# Patient Record
Sex: MALE | Race: WHITE | NOT HISPANIC OR LATINO | Employment: UNEMPLOYED | URBAN - METROPOLITAN AREA
[De-identification: names, ages, dates, MRNs, and addresses within clinical notes are randomized per-mention and may not be internally consistent; named-entity substitution may affect disease eponyms.]

---

## 2020-09-23 ENCOUNTER — OFFICE VISIT (OUTPATIENT)
Dept: FAMILY MEDICINE | Facility: CLINIC | Age: 35
End: 2020-09-23
Payer: COMMERCIAL

## 2020-09-23 VITALS
TEMPERATURE: 98.8 F | BODY MASS INDEX: 27.86 KG/M2 | SYSTOLIC BLOOD PRESSURE: 118 MMHG | HEART RATE: 93 BPM | DIASTOLIC BLOOD PRESSURE: 76 MMHG | OXYGEN SATURATION: 97 % | WEIGHT: 194.6 LBS | HEIGHT: 70 IN

## 2020-09-23 DIAGNOSIS — Z00.00 ROUTINE HISTORY AND PHYSICAL EXAMINATION OF ADULT: Primary | ICD-10-CM

## 2020-09-23 LAB
ALBUMIN SERPL-MCNC: 4.2 G/DL (ref 3.4–5)
ALP SERPL-CCNC: 74 U/L (ref 40–150)
ALT SERPL W P-5'-P-CCNC: 24 U/L (ref 0–70)
ANION GAP SERPL CALCULATED.3IONS-SCNC: 8 MMOL/L (ref 3–14)
AST SERPL W P-5'-P-CCNC: 10 U/L (ref 0–45)
BASOPHILS # BLD AUTO: 0 10E9/L (ref 0–0.2)
BASOPHILS NFR BLD AUTO: 0.7 %
BILIRUB SERPL-MCNC: 0.5 MG/DL (ref 0.2–1.3)
BUN SERPL-MCNC: 9 MG/DL (ref 7–30)
CALCIUM SERPL-MCNC: 8.9 MG/DL (ref 8.5–10.1)
CHLORIDE SERPL-SCNC: 108 MMOL/L (ref 94–109)
CHOLEST SERPL-MCNC: 223 MG/DL
CO2 SERPL-SCNC: 22 MMOL/L (ref 20–32)
CREAT SERPL-MCNC: 0.79 MG/DL (ref 0.66–1.25)
DIFFERENTIAL METHOD BLD: NORMAL
EOSINOPHIL # BLD AUTO: 0.2 10E9/L (ref 0–0.7)
EOSINOPHIL NFR BLD AUTO: 3 %
ERYTHROCYTE [DISTWIDTH] IN BLOOD BY AUTOMATED COUNT: 13.2 % (ref 10–15)
GFR SERPL CREATININE-BSD FRML MDRD: >90 ML/MIN/{1.73_M2}
GLUCOSE SERPL-MCNC: 87 MG/DL (ref 70–99)
HCT VFR BLD AUTO: 47.5 % (ref 40–53)
HDLC SERPL-MCNC: 35 MG/DL
HGB BLD-MCNC: 15.8 G/DL (ref 13.3–17.7)
IMM GRANULOCYTES # BLD: 0.1 10E9/L (ref 0–0.4)
IMM GRANULOCYTES NFR BLD: 0.9 %
LDLC SERPL CALC-MCNC: 129 MG/DL
LYMPHOCYTES # BLD AUTO: 2.3 10E9/L (ref 0.8–5.3)
LYMPHOCYTES NFR BLD AUTO: 39.4 %
MCH RBC QN AUTO: 30.8 PG (ref 26.5–33)
MCHC RBC AUTO-ENTMCNC: 33.3 G/DL (ref 31.5–36.5)
MCV RBC AUTO: 93 FL (ref 78–100)
MONOCYTES # BLD AUTO: 0.3 10E9/L (ref 0–1.3)
MONOCYTES NFR BLD AUTO: 6 %
NEUTROPHILS # BLD AUTO: 2.9 10E9/L (ref 1.6–8.3)
NEUTROPHILS NFR BLD AUTO: 50 %
NONHDLC SERPL-MCNC: 188 MG/DL
NRBC # BLD AUTO: 0 10*3/UL
NRBC BLD AUTO-RTO: 0 /100
PLATELET # BLD AUTO: 320 10E9/L (ref 150–450)
POTASSIUM SERPL-SCNC: 4.3 MMOL/L (ref 3.4–5.3)
PROT SERPL-MCNC: 7.7 G/DL (ref 6.8–8.8)
RBC # BLD AUTO: 5.13 10E12/L (ref 4.4–5.9)
SODIUM SERPL-SCNC: 139 MMOL/L (ref 133–144)
TRIGL SERPL-MCNC: 292 MG/DL
TSH SERPL DL<=0.005 MIU/L-ACNC: 1.23 MU/L (ref 0.4–4)
WBC # BLD AUTO: 5.7 10E9/L (ref 4–11)

## 2020-09-23 ASSESSMENT — ANXIETY QUESTIONNAIRES
7. FEELING AFRAID AS IF SOMETHING AWFUL MIGHT HAPPEN: SEVERAL DAYS
3. WORRYING TOO MUCH ABOUT DIFFERENT THINGS: SEVERAL DAYS
6. BECOMING EASILY ANNOYED OR IRRITABLE: MORE THAN HALF THE DAYS
1. FEELING NERVOUS, ANXIOUS, OR ON EDGE: SEVERAL DAYS
5. BEING SO RESTLESS THAT IT IS HARD TO SIT STILL: SEVERAL DAYS
IF YOU CHECKED OFF ANY PROBLEMS ON THIS QUESTIONNAIRE, HOW DIFFICULT HAVE THESE PROBLEMS MADE IT FOR YOU TO DO YOUR WORK, TAKE CARE OF THINGS AT HOME, OR GET ALONG WITH OTHER PEOPLE: NOT DIFFICULT AT ALL
GAD7 TOTAL SCORE: 9
2. NOT BEING ABLE TO STOP OR CONTROL WORRYING: SEVERAL DAYS

## 2020-09-23 ASSESSMENT — PATIENT HEALTH QUESTIONNAIRE - PHQ9
5. POOR APPETITE OR OVEREATING: MORE THAN HALF THE DAYS
SUM OF ALL RESPONSES TO PHQ QUESTIONS 1-9: 6

## 2020-09-23 ASSESSMENT — MIFFLIN-ST. JEOR: SCORE: 1819.19

## 2020-09-23 NOTE — NURSING NOTE
"35 year old  Chief Complaint   Patient presents with     Physical     Flu shot       Blood pressure 118/76, pulse 93, temperature 98.8  F (37.1  C), temperature source Oral, height 1.77 m (5' 9.7\"), weight 88.3 kg (194 lb 9.6 oz), SpO2 97 %. Body mass index is 28.16 kg/m .  BP completed using cuff size:    There is no problem list on file for this patient.      Wt Readings from Last 2 Encounters:   09/23/20 88.3 kg (194 lb 9.6 oz)     BP Readings from Last 3 Encounters:   09/23/20 118/76       No Known Allergies    Current Outpatient Medications   Medication     FINASTERIDE PO     No current facility-administered medications for this visit.        Social History     Tobacco Use     Smoking status: Never Smoker     Smokeless tobacco: Never Used   Substance Use Topics     Alcohol use: None     Drug use: None       Honoring Choices - Health Care Directive Guide offered to patient at time of visit.    Health Maintenance Due   Topic Date Due     PREVENTIVE CARE VISIT  1985     HIV SCREENING  04/07/2000     DTAP/TDAP/TD IMMUNIZATION (1 - Tdap) 04/07/2010     PHQ-2  01/01/2020     LIPID  04/07/2020     INFLUENZA VACCINE (1) 09/01/2020         There is no immunization history on file for this patient.    No results found for: PAP      No lab results found.    No flowsheet data found.    PHQ-9 SCORE 9/23/2020   PHQ-9 Total Score 6       CARROL-7 SCORE 9/23/2020   Total Score 9       No flowsheet data found.      Maryjo Joya, [facility]  September 23, 2020 12:49 PM    "

## 2020-09-24 LAB — DEPRECATED CALCIDIOL+CALCIFEROL SERPL-MC: 21 UG/L (ref 20–75)

## 2020-09-24 ASSESSMENT — ANXIETY QUESTIONNAIRES: GAD7 TOTAL SCORE: 9

## 2020-09-24 NOTE — PROGRESS NOTES
3  SUBJECTIVE:   CC: Ralph Nieves is an 35 year old male who presents for preventive health visit.     Patient has been advised of split billing requirements and indicates understanding: Yes  Healthy Habits:    Do you get at least three servings of calcium containing foods daily (dairy, green leafy vegetables, etc.)? yes    Amount of exercise or daily activities, outside of work: 20 minutes 5 times/week    Problems taking medications regularly No    Medication side effects: No    Have you had an eye exam in the past two years? yes    Do you see a dentist twice per year? yes    Do you have sleep apnea, excessive snoring or daytime drowsiness?no    Today's PHQ-2 Score: No flowsheet data found.    Abuse: Current or Past(Physical, Sexual or Emotional)- No  Do you feel safe in your environment? Yes        Social History     Tobacco Use     Smoking status: Never Smoker     Smokeless tobacco: Never Used   Substance Use Topics     Alcohol use: Not Currently     Comment: none     If you drink alcohol do you typically have >3 drinks per day or >7 drinks per week? No                      Last PSA: No results found for: PSA    Reviewed orders with patient. Reviewed health maintenance and updated orders accordingly - Yes  Lab work is in process    Reviewed and updated as needed this visit by clinical staff  Tobacco  Allergies  Meds         Reviewed and updated as needed this visit by Provider        No past medical history on file.   Past Surgical History:   Procedure Laterality Date     APPENDECTOMY  2015    Ruptured in [place]     XR KNEE SURGERY HENRIQUE LEFT         ROS:  CONSTITUTIONAL: NEGATIVE for fever, chills, change in weight  INTEGUMENTARY/SKIN: NEGATIVE for worrisome rashes, moles or lesions  EYES: NEGATIVE for vision changes or irritation  ENT: NEGATIVE for ear, mouth and throat problems  RESP: NEGATIVE for significant cough or SOB  CV: NEGATIVE for chest pain, palpitations or peripheral edema  GI: NEGATIVE for nausea,  "abdominal pain, heartburn, or change in bowel habits   male: negative for dysuria, hematuria, decreased urinary stream, erectile dysfunction, urethral discharge  MUSCULOSKELETAL: NEGATIVE for significant arthralgias or myalgia  NEURO: NEGATIVE for weakness, dizziness or paresthesias  PSYCHIATRIC: NEGATIVE for changes in mood or affect    OBJECTIVE:   /76   Pulse 93   Temp 98.8  F (37.1  C) (Oral)   Ht 1.77 m (5' 9.7\")   Wt 88.3 kg (194 lb 9.6 oz)   SpO2 97%   BMI 28.16 kg/m    EXAM:  GENERAL: healthy, alert and no distress  EYES: Eyes grossly normal to inspection, PERRL and conjunctivae and sclerae normal  HENT: ear canals and TM's normal, nose and mouth without ulcers or lesions  NECK: no adenopathy, no asymmetry, masses, or scars and thyroid normal to palpation  RESP: lungs clear to auscultation - no rales, rhonchi or wheezes  CV: regular rate and rhythm, normal S1 S2, no S3 or S4, no murmur, click or rub, no peripheral edema and peripheral pulses strong  :no inguinal hernias, normal scrotum, penis,  testes normal  ABDOMEN: soft, nontender, no hepatosplenomegaly, no masses and bowel sounds normal  MS: no gross musculoskeletal defects noted, no edema  SKIN: no suspicious lesions or rashes  NEURO: Normal strength and tone, mentation intact and speech normal  PSYCH: mentation appears normal, affect normal/bright    none     ASSESSMENT/PLAN:   1. Routine history and physical examination of adult    - CBC with platelets differential  - Comprehensive metabolic panel  - TSH with free T4 reflex  - Lipid panel reflex to direct LDL Fasting  - Vitamin D Level    Patient has been advised of split billing requirements and indicates understanding: Yes  COUNSELING:  Reviewed preventive health counseling, as reflected in patient instructions       Regular exercise       Healthy diet/nutrition       Vision screening       Alcohol Use    Estimated body mass index is 28.16 kg/m  as calculated from the following:    " "Height as of this encounter: 1.77 m (5' 9.7\").    Weight as of this encounter: 88.3 kg (194 lb 9.6 oz).    Return to clinic one year and prn      He reports that he has never smoked. He has never used smokeless tobacco.      Counseling Resources:  ATP IV Guidelines  Pooled Cohorts Equation Calculator  FRAX Risk Assessment  ICSI Preventive Guidelines  Dietary Guidelines for Americans, 2010  USDA's MyPlate  ASA Prophylaxis  Lung CA Screening    Celeste Roy NP  [facility]    "

## 2020-10-13 ENCOUNTER — VIRTUAL VISIT (OUTPATIENT)
Dept: FAMILY MEDICINE | Facility: CLINIC | Age: 35
End: 2020-10-13
Payer: COMMERCIAL

## 2020-10-13 DIAGNOSIS — E78.5 HYPERLIPIDEMIA LDL GOAL <100: Primary | ICD-10-CM

## 2020-10-13 DIAGNOSIS — R79.89 LOW VITAMIN D LEVEL: ICD-10-CM

## 2020-10-13 PROCEDURE — 99213 OFFICE O/P EST LOW 20 MIN: CPT | Mod: 95 | Performed by: NURSE PRACTITIONER

## 2020-10-13 ASSESSMENT — PAIN SCALES - GENERAL: PAINLEVEL: NO PAIN (0)

## 2020-10-13 NOTE — NURSING NOTE
Chief Complaint   Patient presents with     Results     Follow up on lab results.     TOMEKA Machuca 10:30 AM  10/13/2020

## 2020-10-13 NOTE — PROGRESS NOTES
"Ralph Nieves is a 35 year old male who is being evaluated via a billable video visit.      The patient has been notified of following:     \"This video visit will be conducted via a call between you and your physician/provider. We have found that certain health care needs can be provided without the need for an in-person physical exam.  This service lets us provide the care you need with a video conversation.  If a prescription is necessary we can send it directly to your pharmacy.  If lab work is needed we can place an order for that and you can then stop by our lab to have the test done at a later time.    Video visits are billed at different rates depending on your insurance coverage.  Please reach out to your insurance provider with any questions.    If during the course of the call the physician/provider feels a video visit is not appropriate, you will not be charged for this service.\"    Patient has given verbal consent for Video visit? Yes  How would you like to obtain your AVS? MyChart  If you are dropped from the video visit, the video invite should be resent to: Other e-mail: [place]  Will anyone else be joining your video visit? No    Subjective     Ralph Nieves is a 35 year old male who presents today via video visit for the following health issues:  Ralph is following on his labs from 2 weeks ago, they show hyperlipidemia and a low vitamin D level.  He does not have a strong family history of lipid issues or heart disease.  He has not problems with his BP or exercise/activity tolerance, yet he is not doing much during COVID, he walks his dogs once/day for 30 minutes.  He is aware that due to weather that will end soon as well.    Video Start Time:  1102    Review of Systems   CONSTITUTIONAL: NEGATIVE for fever, chills, change in weight  ENT/MOUTH: NEGATIVE for ear, mouth and throat problems  RESP: NEGATIVE for significant cough or SOB  CV: NEGATIVE for chest pain, palpitations or peripheral edema    "   Objective       Vitals:  No vitals were obtained today due to virtual visit.    Physical Exam     GENERAL: Healthy, alert and no distress  EYES: Eyes grossly normal to inspection.  No discharge or erythema, or obvious scleral/conjunctival abnormalities.  RESP: No audible wheeze, cough, or visible cyanosis.  No visible retractions or increased work of breathing.    SKIN: Visible skin clear. No significant rash, abnormal pigmentation or lesions.  NEURO: Cranial nerves grossly intact.  Mentation and speech appropriate for age.  PSYCH: Mentation appears normal, affect normal/bright, judgement and insight intact, normal speech and appearance well-groomed.      Office Visit on 09/23/2020   Component Date Value Ref Range Status     WBC 09/23/2020 5.7  4.0 - 11.0 10e9/L Final     RBC Count 09/23/2020 5.13  4.4 - 5.9 10e12/L Final     Hemoglobin 09/23/2020 15.8  13.3 - 17.7 g/dL Final     Hematocrit 09/23/2020 47.5  40.0 - 53.0 % Final     MCV 09/23/2020 93  78 - 100 fl Final     MCH 09/23/2020 30.8  26.5 - 33.0 pg Final     MCHC 09/23/2020 33.3  31.5 - 36.5 g/dL Final     RDW 09/23/2020 13.2  10.0 - 15.0 % Final     Platelet Count 09/23/2020 320  150 - 450 10e9/L Final     Diff Method 09/23/2020 Automated Method   Final     % Neutrophils 09/23/2020 50.0  % Final     % Lymphocytes 09/23/2020 39.4  % Final     % Monocytes 09/23/2020 6.0  % Final     % Eosinophils 09/23/2020 3.0  % Final     % Basophils 09/23/2020 0.7  % Final     % Immature Granulocytes 09/23/2020 0.9  % Final     Nucleated RBCs 09/23/2020 0  0 /100 Final     Absolute Neutrophil 09/23/2020 2.9  1.6 - 8.3 10e9/L Final     Absolute Lymphocytes 09/23/2020 2.3  0.8 - 5.3 10e9/L Final     Absolute Monocytes 09/23/2020 0.3  0.0 - 1.3 10e9/L Final     Absolute Eosinophils 09/23/2020 0.2  0.0 - 0.7 10e9/L Final     Absolute Basophils 09/23/2020 0.0  0.0 - 0.2 10e9/L Final     Abs Immature Granulocytes 09/23/2020 0.1  0 - 0.4 10e9/L Final     Absolute Nucleated RBC  09/23/2020 0.0   Final     Sodium 09/23/2020 139  133 - 144 mmol/L Final     Potassium 09/23/2020 4.3  3.4 - 5.3 mmol/L Final     Chloride 09/23/2020 108  94 - 109 mmol/L Final     Carbon Dioxide 09/23/2020 22  20 - 32 mmol/L Final     Anion Gap 09/23/2020 8  3 - 14 mmol/L Final     Glucose 09/23/2020 87  70 - 99 mg/dL Final     Urea Nitrogen 09/23/2020 9  7 - 30 mg/dL Final     Creatinine 09/23/2020 0.79  0.66 - 1.25 mg/dL Final     GFR Estimate 09/23/2020 >90  >60 mL/min/[1.73_m2] Final    Comment: Non  GFR Calc  Starting 12/18/2018, serum creatinine based estimated GFR (eGFR) will be   calculated using the Chronic Kidney Disease Epidemiology Collaboration   (CKD-EPI) equation.       GFR Estimate If Black 09/23/2020 >90  >60 mL/min/[1.73_m2] Final    Comment:  GFR Calc  Starting 12/18/2018, serum creatinine based estimated GFR (eGFR) will be   calculated using the Chronic Kidney Disease Epidemiology Collaboration   (CKD-EPI) equation.       Calcium 09/23/2020 8.9  8.5 - 10.1 mg/dL Final     Bilirubin Total 09/23/2020 0.5  0.2 - 1.3 mg/dL Final     Albumin 09/23/2020 4.2  3.4 - 5.0 g/dL Final     Protein Total 09/23/2020 7.7  6.8 - 8.8 g/dL Final     Alkaline Phosphatase 09/23/2020 74  40 - 150 U/L Final     ALT 09/23/2020 24  0 - 70 U/L Final     AST 09/23/2020 10  0 - 45 U/L Final     TSH 09/23/2020 1.23  0.40 - 4.00 mU/L Final     Cholesterol 09/23/2020 223* <200 mg/dL Final    Desirable:       <200 mg/dl     Triglycerides 09/23/2020 292* <150 mg/dL Final    Comment: Borderline high:  150-199 mg/dl  High:             200-499 mg/dl  Very high:       >499 mg/dl       HDL Cholesterol 09/23/2020 35* >39 mg/dL Final     LDL Cholesterol Calculated 09/23/2020 129* <100 mg/dL Final    Comment: Above desirable:  100-129 mg/dl  Borderline High:  130-159 mg/dL  High:             160-189 mg/dL  Very high:       >189 mg/dl       Non HDL Cholesterol 09/23/2020 188* <130 mg/dL Final    Comment:  Above Desirable:  130-159 mg/dl  Borderline high:  160-189 mg/dl  High:             190-219 mg/dl  Very high:       >219 mg/dl       Vitamin D Deficiency screening 09/23/2020 21  20 - 75 ug/L Final    Comment: Season, race, dietary intake, and treatment affect the concentration of   25-hydroxy-Vitamin D. Values may decrease during winter months and increase   during summer months. Values 20-29 ug/L may indicate Vitamin D insufficiency   and values <20 ug/L may indicate Vitamin D deficiency.  Vitamin D determination is routinely performed by an immunoassay specific for   25 hydroxyvitamin D3.  If an individual is on vitamin D2 (ergocalciferol)   supplementation, please specify 25 OH vitamin D2 and D3 level determination by   LCMSMS test VITD23.            1. Hyperlipidemia LDL goal <100    We discussed diet and exercise as baseline.  Because Ralph has not focused on this, he will do this for 6 months and RTC for follow up then.  If unable to see progress with diet/exercise we will consider medication.      2. Low vitamin D level    Vitamin D supplement, 5000 international unit(s) daily for 30 days, then 2000 international unit(s) daily.  We will repeat level in 6 months.      Video-Visit Details    Type of service:  Video Visit     Video End Time: 1120    Originating Location (pt. Location): Home    Distant Location (provider location):  [facility] [place]     Platform used for Video Visit: Yecuris

## 2021-09-15 ENCOUNTER — ALLIED HEALTH/NURSE VISIT (OUTPATIENT)
Dept: FAMILY MEDICINE | Facility: CLINIC | Age: 36
End: 2021-09-15
Payer: COMMERCIAL

## 2021-09-15 DIAGNOSIS — Z23 NEED FOR INFLUENZA VACCINATION: Primary | ICD-10-CM

## 2021-10-11 ENCOUNTER — OFFICE VISIT (OUTPATIENT)
Dept: FAMILY MEDICINE | Facility: CLINIC | Age: 36
End: 2021-10-11
Payer: COMMERCIAL

## 2021-10-11 VITALS
HEIGHT: 70 IN | OXYGEN SATURATION: 99 % | SYSTOLIC BLOOD PRESSURE: 159 MMHG | BODY MASS INDEX: 29.81 KG/M2 | HEART RATE: 92 BPM | WEIGHT: 208.2 LBS | DIASTOLIC BLOOD PRESSURE: 102 MMHG | TEMPERATURE: 99 F

## 2021-10-11 DIAGNOSIS — F41.9 ANXIETY AND DEPRESSION: Primary | ICD-10-CM

## 2021-10-11 DIAGNOSIS — F32.A ANXIETY AND DEPRESSION: Primary | ICD-10-CM

## 2021-10-11 RX ORDER — ESCITALOPRAM OXALATE 5 MG/1
5 TABLET ORAL DAILY
Qty: 60 TABLET | Refills: 1 | Status: SHIPPED | OUTPATIENT
Start: 2021-10-11 | End: 2021-11-02

## 2021-10-11 ASSESSMENT — ANXIETY QUESTIONNAIRES
IF YOU CHECKED OFF ANY PROBLEMS ON THIS QUESTIONNAIRE, HOW DIFFICULT HAVE THESE PROBLEMS MADE IT FOR YOU TO DO YOUR WORK, TAKE CARE OF THINGS AT HOME, OR GET ALONG WITH OTHER PEOPLE: VERY DIFFICULT
5. BEING SO RESTLESS THAT IT IS HARD TO SIT STILL: NEARLY EVERY DAY
3. WORRYING TOO MUCH ABOUT DIFFERENT THINGS: NEARLY EVERY DAY
2. NOT BEING ABLE TO STOP OR CONTROL WORRYING: NEARLY EVERY DAY
GAD7 TOTAL SCORE: 19
6. BECOMING EASILY ANNOYED OR IRRITABLE: NEARLY EVERY DAY
1. FEELING NERVOUS, ANXIOUS, OR ON EDGE: NEARLY EVERY DAY
7. FEELING AFRAID AS IF SOMETHING AWFUL MIGHT HAPPEN: SEVERAL DAYS

## 2021-10-11 ASSESSMENT — PATIENT HEALTH QUESTIONNAIRE - PHQ9
5. POOR APPETITE OR OVEREATING: NEARLY EVERY DAY
SUM OF ALL RESPONSES TO PHQ QUESTIONS 1-9: 18

## 2021-10-11 ASSESSMENT — MIFFLIN-ST. JEOR: SCORE: 1882.23

## 2021-10-11 NOTE — NURSING NOTE
"ROOM:1    Preferred Name: Ralph     36 year old  Chief Complaint   Patient presents with     Anxiety     needing medication     Depression       Blood pressure (!) 159/102, pulse 85, temperature 99  F (37.2  C), temperature source Oral, height 1.781 m (5' 10.1\"), weight 94.4 kg (208 lb 3.2 oz), SpO2 99 %. Body mass index is 29.79 kg/m .      There is no problem list on file for this patient.      Wt Readings from Last 2 Encounters:   10/11/21 94.4 kg (208 lb 3.2 oz)   09/23/20 88.3 kg (194 lb 9.6 oz)     BP Readings from Last 3 Encounters:   10/11/21 (!) 159/102   09/23/20 118/76       No Known Allergies    Current Outpatient Medications   Medication     FINASTERIDE PO     No current facility-administered medications for this visit.       Social History     Tobacco Use     Smoking status: Never Smoker     Smokeless tobacco: Never Used   Substance Use Topics     Alcohol use: Not Currently     Comment: none     Drug use: Never       Honoring Choices - Health Care Directive Guide offered to patient at time of visit.    Health Maintenance Due   Topic Date Due     ADVANCE CARE PLANNING  Never done     HIV SCREENING  Never done     HEPATITIS C SCREENING  Never done     DTAP/TDAP/TD IMMUNIZATION (1 - Tdap) Never done     PREVENTIVE CARE VISIT  09/23/2021       Immunization History   Administered Date(s) Administered     COVID-19,PF,Sol 04/11/2021     Influenza Vaccine IM > 6 months Valent IIV4 (Alfuria,Fluzone) 09/15/2018, 09/23/2020, 09/15/2021       No results found for: PAP      Recent Labs   Lab Test 09/23/20  1306   *   HDL 35*   TRIG 292*   ALT 24   CR 0.79   GFRESTIMATED >90   GFRESTBLACK >90   ALBUMIN 4.2   POTASSIUM 4.3   TSH 1.23       No flowsheet data found.    PHQ-9 SCORE 9/23/2020 10/11/2021   PHQ-9 Total Score 6 18       CARROL-7 SCORE 9/23/2020 10/11/2021   Total Score 9 19       No flowsheet data found.      Maryjo Joya, MATTHEW  October 11, 2021 7:50 AM    "

## 2021-10-11 NOTE — PROGRESS NOTES
"Ralph Nieves is a 36 year old male who presents today to discuss anxiety.  Ralph has had 3 new jobs in 6 months, the job he has is very high stress.  He feels like he is looking at his phone 24 hours/day, he is irritable, he is not sleeping well.  He does not feel like he is handling his emotions well.  When seen in clinic one year ago, his vitamin D level was on the very low side of normal, his cholesterol was elevated.  He takes his vitamin D when he thinks about it, he has not changed dietary of exercise habits.     His physical manifestations are his knee \"bouncing\" all the time, he feels energy shooting out of his fingertips.  He has not ever seen a therapist or taken medication for any of these feelings or thoughts.  He denies suicidal or homicidal ideation.    He has family members that are pointing out his issues to him, they feel that he needs to manage better.      Review Of Systems   ROS: 10 point ROS neg other than the symptoms noted above in the HPI.      History reviewed. No pertinent past medical history.  Past Surgical History:   Procedure Laterality Date     APPENDECTOMY  2015    Ruptured in [place]     XR KNEE SURGERY HENRIQUE LEFT       Social History     Socioeconomic History     Marital status: Single     Spouse name: Not on file     Number of children: Not on file     Years of education: Not on file     Highest education level: Not on file   Occupational History     Not on file   Tobacco Use     Smoking status: Never Smoker     Smokeless tobacco: Never Used   Substance and Sexual Activity     Alcohol use: Not Currently     Comment: none     Drug use: Never     Sexual activity: Yes     Partners: Female     Birth control/protection: I.U.D.   Other Topics Concern     Not on file   Social History Narrative     Not on file     Social Determinants of Health     Financial Resource Strain:      Difficulty of Paying Living Expenses:    Food Insecurity:      Worried About Running Out of Food in the Last Year:  " "    Ran Out of Food in the Last Year:    Transportation Needs:      Lack of Transportation (Medical):      Lack of Transportation (Non-Medical):    Physical Activity:      Days of Exercise per Week:      Minutes of Exercise per Session:    Stress:      Feeling of Stress :    Social Connections:      Frequency of Communication with Friends and Family:      Frequency of Social Gatherings with Friends and Family:      Attends Religion Services:      Active Member of Clubs or Organizations:      Attends Club or Organization Meetings:      Marital Status:    Intimate Partner Violence:      Fear of Current or Ex-Partner:      Emotionally Abused:      Physically Abused:      Sexually Abused:      Family History   Problem Relation Age of Onset     No Known Problems Mother      No Known Problems Father      Diabetes Maternal Grandfather        BP (!) 159/102   Pulse 92   Temp 99  F (37.2  C) (Oral)   Ht 1.781 m (5' 10.1\")   Wt 94.4 kg (208 lb 3.2 oz)   SpO2 99%   BMI 29.79 kg/m      Exam:  Constitutional: healthy, alert and moderate distress  Cardiovascular: BP:  159/102  Psychiatric: anxious, agitated, knee bouncing      Assessment/Plan:  1. Anxiety and depression    - escitalopram (LEXAPRO) 5 MG tablet; Take 1 tablet (5 mg) by mouth daily  Dispense: 60 tablet; Refill: 1    Ralph states he has access to a therapist and he will make that appointment himself.  Return to clinic in 2 weeks, virtual visit if desired.     Take BP at home and send readings.     Ralph preferred not to do labs today.      Options for treatment and follow-up care were reviewed with the patient. Patient engaged in the decision making process and verbalized understanding of the options discussed and agreed with the final plan.    "

## 2021-10-12 ASSESSMENT — ANXIETY QUESTIONNAIRES: GAD7 TOTAL SCORE: 19

## 2021-10-18 NOTE — NURSING NOTE
Ralph comes into clinic today at the request of Celeste ELIAS Ordering Provider for a Flu Shot.   This service provided today was under the supervising provider of the day Celeste ELIAS who was available if needed

## 2021-11-02 ENCOUNTER — VIRTUAL VISIT (OUTPATIENT)
Dept: FAMILY MEDICINE | Facility: CLINIC | Age: 36
End: 2021-11-02
Payer: COMMERCIAL

## 2021-11-02 DIAGNOSIS — E78.5 HYPERLIPIDEMIA LDL GOAL <100: ICD-10-CM

## 2021-11-02 DIAGNOSIS — F41.9 ANXIETY AND DEPRESSION: ICD-10-CM

## 2021-11-02 DIAGNOSIS — F32.A ANXIETY AND DEPRESSION: ICD-10-CM

## 2021-11-02 DIAGNOSIS — R79.89 LOW VITAMIN D LEVEL: Primary | ICD-10-CM

## 2021-11-02 PROCEDURE — 99213 OFFICE O/P EST LOW 20 MIN: CPT | Mod: 95 | Performed by: NURSE PRACTITIONER

## 2021-11-02 RX ORDER — ESCITALOPRAM OXALATE 10 MG/1
10 TABLET ORAL DAILY
Qty: 90 TABLET | Refills: 1 | Status: SHIPPED | OUTPATIENT
Start: 2021-11-02 | End: 2022-05-23

## 2021-11-02 NOTE — PROGRESS NOTES
Ralph is a 36 year old who is being evaluated via a billable video visit.      How would you like to obtain your AVS? MyChart  If the video visit is dropped, the invitation should be resent by: Send to e-mail at: [email or web address]  Will anyone else be joining your video visit? No    1016      Subjective   Ralph is a 36 year old who presents for the following health issues:  We started Ralph on escitalpram 5 mg on 10/11/21, primarily for anxiety.   We discussed increasing the dose to 10 mg daily after 2-4 weeks, Ralph increased the dose to 10 mg 2 weeks ago.  He is feeling less anxious, he is sleeping better, he feels that the medication is working well.  He denies any side effects.  He feels that otherwise he is doing well.      Ralph was in for a complete PE in September of 2020, it was noted at that time that he had a low vitamin D level.  He has been taking a vitamin D supplement of 5000 IUs since that time.  It was also noted that his had mixed hyperlipidemia.  He is eating well and is interested in repeating these labs.    Review of Systems   CONSTITUTIONAL: NEGATIVE for fever, chills, change in weight  ENT/MOUTH: NEGATIVE for ear, mouth and throat problems  RESP: NEGATIVE for significant cough or SOB  CV: NEGATIVE for chest pain, palpitations or peripheral edema      Objective         Vitals:  No vitals were obtained today due to virtual visit.    Physical Exam   GENERAL: Healthy, alert and no distress  EYES: Eyes grossly normal to inspection.  No discharge or erythema, or obvious scleral/conjunctival abnormalities.  RESP: No audible wheeze, cough, or visible cyanosis.  No visible retractions or increased work of breathing.    SKIN: Visible skin clear. No significant rash, abnormal pigmentation or lesions.  NEURO: Cranial nerves grossly intact.  Mentation and speech appropriate for age.  PSYCH: Mentation appears normal, affect normal/bright, judgement and insight intact, normal speech and appearance  well-groomed.    (R79.89) Low vitamin D level  (primary encounter diagnosis)  Comment: on vitamin D 5000 international unit(s) for one year  Plan: Vitamin D Level       (E78.5) Hyperlipidemia LDL goal <100  Plan: Lipid panel reflex to direct LDL Fasting            (F41.9,  F32.A) Anxiety and depression  Comment: Doing well on the medication, refill for 6 months  Plan: escitalopram (LEXAPRO) 10 MG tablet    Return to clinic in 6 months and prn.    Video-Visit Details    Type of service:  Video Visit    Video End Time:10:33 AM    Originating Location (pt. Location): Home    Distant Location (provider location):  [facility] [place]     Platform used for Video Visit: Rita

## 2021-12-05 ENCOUNTER — HEALTH MAINTENANCE LETTER (OUTPATIENT)
Age: 36
End: 2021-12-05

## 2022-03-21 ENCOUNTER — VIRTUAL VISIT (OUTPATIENT)
Dept: FAMILY MEDICINE | Facility: CLINIC | Age: 37
End: 2022-03-21
Payer: COMMERCIAL

## 2022-03-21 DIAGNOSIS — I10 BENIGN ESSENTIAL HYPERTENSION: ICD-10-CM

## 2022-03-21 DIAGNOSIS — R06.83 SNORING: Primary | ICD-10-CM

## 2022-03-21 NOTE — PROGRESS NOTES
"Ralph is a 36 year old who is being evaluated via a billable video visit.      How would you like to obtain your AVS? MyChart  If the video visit is dropped, the invitation should be resent by: Text to cell phone: [phone]  Will anyone else be joining your video visit? No    Video Start Time: 0800    Subjective   Ralph is a 36 year old who presents for the following health issues- Ralph states that he has been told over the past month that his snoring has gotten very loud and very consistent.  It is a change from his previous pattern, he is wondering if there is some relationship between weight gain and snoring.  He denies any other upper respiratory symptoms, he is not sure if he is apneic.  He does get \"poked and prodded\" in the night because of his snoring,  so being tired during the day is not unexpected for him.      We did note hypertension on his in-person visit in October.  He has not been taking his BP at home.      Review of Systems   CONSTITUTIONAL: NEGATIVE for fever, chills, change in weight  ENT/MOUTH: NEGATIVE for ear, mouth and throat problems  RESP: NEGATIVE for significant cough or SOB  CV: NEGATIVE for chest pain, palpitations or peripheral edema      Objective         Vitals:  No vitals were obtained today due to virtual visit.    Physical Exam   GENERAL: Healthy, alert and no distress  EYES: Eyes grossly normal to inspection.  No discharge or erythema, or obvious scleral/conjunctival abnormalities.  RESP: No audible wheeze, cough, or visible cyanosis.  No visible retractions or increased work of breathing.    SKIN: Visible skin clear. No significant rash, abnormal pigmentation or lesions.  NEURO: Cranial nerves grossly intact.  Mentation and speech appropriate for age.  PSYCH: Mentation appears normal, affect normal/bright, judgement and insight intact, normal speech and appearance well-groomed.    (R06.83) Snoring  (primary encounter diagnosis)  Comment: Recent onset and increase in occurrence " and intensity  Plan: Otolaryngology Referral  Due to the recent onset we will follow up with otolaryngology first to determine if there is an anatomical reason for his snoring.      (I10) Benign essential hypertension  Comment: needs to be seen in clinic   Plan: Take BP at home, follow up as soon as able.    Video-Visit Details    Type of service:  Video Visit    Video End Time:0830    Originating Location (pt. Location): Home    Distant Location (provider location):  [facility]     Platform used for Video Visit: Rita

## 2022-05-23 ENCOUNTER — VIRTUAL VISIT (OUTPATIENT)
Dept: FAMILY MEDICINE | Facility: CLINIC | Age: 37
End: 2022-05-23
Payer: COMMERCIAL

## 2022-05-23 DIAGNOSIS — F41.9 ANXIETY AND DEPRESSION: ICD-10-CM

## 2022-05-23 DIAGNOSIS — F32.A ANXIETY AND DEPRESSION: ICD-10-CM

## 2022-05-23 RX ORDER — ESCITALOPRAM OXALATE 20 MG/1
20 TABLET ORAL DAILY
Qty: 90 TABLET | Refills: 3 | Status: SHIPPED | OUTPATIENT
Start: 2022-05-23 | End: 2022-09-01

## 2022-05-23 NOTE — PROGRESS NOTES
"Ralph is a 37 year old who is being evaluated via a billable video visit.      How would you like to obtain your AVS? MyChart  If the video visit is dropped, the invitation should be resent by: Text to cell phone: [phone]  Will anyone else be joining your video visit? No    Video Start Time: 0820    Subjective   Ralph is a 37 year old who presents for the following health issues, he is following up on his use and response to es citalopram 10 mg daily.  He started out at 5 mg daily, he felt that bumping up to 10 really hasn't changed anything.  He felt his response to either dose is negligible. He continues to have anxiety symptoms, he just doesn't feel any difference at all.  He notices that he continues to 'bounce\" his knee up and down, he drinks coffee most of the day (now decaf, it doesn't matter to him what kind.)  He is questioning whether to stay on the same medication and increase the dose or to change medications.     Review of Systems   CONSTITUTIONAL: NEGATIVE for fever, chills, change in weight  ENT/MOUTH: NEGATIVE for ear, mouth and throat problems  RESP: NEGATIVE for significant cough or SOB  CV: NEGATIVE for chest pain, palpitations or peripheral edema      Objective       Vitals:  No vitals were obtained today due to virtual visit.    Physical Exam   GENERAL: Healthy, alert and no distress  EYES: Eyes grossly normal to inspection.  No discharge or erythema, or obvious scleral/conjunctival abnormalities.  RESP: No audible wheeze, cough, or visible cyanosis.  No visible retractions or increased work of breathing.    SKIN: Visible skin clear. No significant rash, abnormal pigmentation or lesions.  NEURO: Cranial nerves grossly intact.  Mentation and speech appropriate for age.  PSYCH: Mentation appears normal, affect normal/bright, judgement and insight intact, normal speech and appearance well-groomed.    (F41.9,  F32.A) Anxiety and depression    Plan: escitalopram (LEXAPRO) 20 MG tablet  We decided " to increase the dose of his current medication as he is not experiencing any side effects.    Return to clinic in 6-8 weeks and prn      Video-Visit Details    Type of service:  Video Visit    Video End Time:0840    Originating Location (pt. Location): Home    Distant Location (provider location):  [facility]     Platform used for Video Visit: Rita

## 2022-05-30 NOTE — PROGRESS NOTES
I am seeing this patient in consultation for snoring at the request of the provider Celeste Roy.    Chief Complaint - Snoring    History of Present Illness - Ralph Nieves is a 37 year old male who presents for evaluation of snoring. The patient describes symptoms of snoring. The patient notes no known holding pauses during sleep. They describe okay sleep and no significant daytime tiredness. The patient has never had a sleep study. They have tried breathe right strips, nasal cones, sleeping upright to improve snoring. He is a side sleeper. The patient describes no nasal obstruction. No prior history of nasal surgery, sinus surgery. No tonsillectomy. No history of smoking. I personally reviewed the relevant clinical notes in Epic including the primary care providers note.     TSH 9/2020 reviewed and was normal. Vitamin D from 9/23/202 reviewed and was normal.     Past Medical History - healthy     Current Medications -   Current Outpatient Medications:      escitalopram (LEXAPRO) 20 MG tablet, Take 1 tablet (20 mg) by mouth daily, Disp: 90 tablet, Rfl: 3     FINASTERIDE PO, Take 1.25 mg by mouth daily , Disp: , Rfl:     Allergies - No Known Allergies    Social History -   Social History     Socioeconomic History     Marital status: Single   Tobacco Use     Smoking status: Never Smoker     Smokeless tobacco: Never Used   Substance and Sexual Activity     Alcohol use: Not Currently     Comment: none     Drug use: Never     Sexual activity: Yes     Partners: Female     Birth control/protection: I.U.D.       Family History -   Family History   Problem Relation Age of Onset     No Known Problems Mother      No Known Problems Father      Diabetes Maternal Grandfather      Review of Systems - As per HPI and PMHx, otherwise 7 system review of the head and neck is negative.    Physical Exam  BP (!) 149/87   Pulse 108   Resp 18   SpO2 97%   General - The patient is over weight mildly. Alert and oriented to person and  place, answers questions and cooperates with examination appropriately.   Neurologic - CN II-XII are grossly intact. No focal neurologic deficits.  Voice and Breathing - The patient was breathing comfortably without the use of accessory muscles. There was no wheezing, stridor, or stertor.  The patients voice was clear and strong.  Eyes - Extraocular movements intact. Sclera were not icteric or injected, conjunctiva were pink and moist.  Mouth - Examination of the oral cavity showed pink, healthy oral mucosa. No lesions or ulcerations noted.  The tongue was mobile and midline.  Throat - The walls of the oropharynx were smooth, symmetric, and had no lesions or ulcerations. The uvula was midline on elevation, not elongated. Mallampati 3 of 4. Tonsils 1+.   Nose - External contour is symmetric, no gross deflection or scars.  Nasal mucosa is pink and moist. The turbinates are normal. The septum was deviated some to the right anteriorly, but not overly obstructive.  No polyps, masses, or purulence noted on examination.  Neck -  Soft. Non-tender. Palpation of the occipital, submental, submandibular, internal jugular chain, and supraclavicular nodes did not demonstrate any abnormal lymph nodes or masses. No parotid masses. Palpation of the thyroid was soft and smooth, with no nodules or goiter appreciated.  The trachea was mobile and midline.        A/P - Ralph Nieves is a 37 year old male with snoring. It doesn't sound like he has sleep apnea. He can consider a sleep medicine referral and sleep study to differentiate between obstructive sleep apnea and primary snoring. We discussed conservative management including weight loss, sleeping on side, and a dental appliance, as well as CPAP if sleep apnea is detected, or possibly UPPP if he has sleep apnea. He doesn't really have nasal obstruction. Return if he wants to consider surgical options.     Pete Guerrero MD  Otolaryngology  [facility]

## 2022-05-31 ENCOUNTER — OFFICE VISIT (OUTPATIENT)
Dept: OTOLARYNGOLOGY | Facility: CLINIC | Age: 37
End: 2022-05-31
Payer: COMMERCIAL

## 2022-05-31 VITALS
SYSTOLIC BLOOD PRESSURE: 149 MMHG | OXYGEN SATURATION: 97 % | RESPIRATION RATE: 18 BRPM | HEART RATE: 108 BPM | DIASTOLIC BLOOD PRESSURE: 87 MMHG

## 2022-05-31 DIAGNOSIS — R06.83 SNORING: Primary | ICD-10-CM

## 2022-05-31 PROCEDURE — 99202 OFFICE O/P NEW SF 15 MIN: CPT | Performed by: OTOLARYNGOLOGY

## 2022-05-31 NOTE — LETTER
5/31/2022         RE: Ralph Nieves  [address]  [facility] [address]        Dear Colleague,    Thank you for referring your patient, Ralph Nieves, to the [facility]. Please see a copy of my visit note below.    I am seeing this patient in consultation for snoring at the request of the provider Celeste Roy.    Chief Complaint - Snoring    History of Present Illness - Ralph Nieves is a 37 year old male who presents for evaluation of snoring. The patient describes symptoms of snoring. The patient notes no known holding pauses during sleep. They describe okay sleep and no significant daytime tiredness. The patient has never had a sleep study. They have tried breathe right strips, nasal cones, sleeping upright to improve snoring. He is a side sleeper. The patient describes no nasal obstruction. No prior history of nasal surgery, sinus surgery. No tonsillectomy. No history of smoking. I personally reviewed the relevant clinical notes in Epic including the primary care providers note.     TSH 9/2020 reviewed and was normal. Vitamin D from 9/23/202 reviewed and was normal.     Past Medical History - healthy     Current Medications -   Current Outpatient Medications:      escitalopram (LEXAPRO) 20 MG tablet, Take 1 tablet (20 mg) by mouth daily, Disp: 90 tablet, Rfl: 3     FINASTERIDE PO, Take 1.25 mg by mouth daily , Disp: , Rfl:     Allergies - No Known Allergies    Social History -   Social History     Socioeconomic History     Marital status: Single   Tobacco Use     Smoking status: Never Smoker     Smokeless tobacco: Never Used   Substance and Sexual Activity     Alcohol use: Not Currently     Comment: none     Drug use: Never     Sexual activity: Yes     Partners: Female     Birth control/protection: I.U.D.       Family History -   Family History   Problem Relation Age of Onset     No Known Problems Mother      No Known Problems Father      Diabetes Maternal Grandfather       Review of Systems - As per HPI and PMHx, otherwise 7 system review of the head and neck is negative.    Physical Exam  BP (!) 149/87   Pulse 108   Resp 18   SpO2 97%   General - The patient is over weight mildly. Alert and oriented to person and place, answers questions and cooperates with examination appropriately.   Neurologic - CN II-XII are grossly intact. No focal neurologic deficits.  Voice and Breathing - The patient was breathing comfortably without the use of accessory muscles. There was no wheezing, stridor, or stertor.  The patients voice was clear and strong.  Eyes - Extraocular movements intact. Sclera were not icteric or injected, conjunctiva were pink and moist.  Mouth - Examination of the oral cavity showed pink, healthy oral mucosa. No lesions or ulcerations noted.  The tongue was mobile and midline.  Throat - The walls of the oropharynx were smooth, symmetric, and had no lesions or ulcerations. The uvula was midline on elevation, not elongated. Mallampati 3 of 4. Tonsils 1+.   Nose - External contour is symmetric, no gross deflection or scars.  Nasal mucosa is pink and moist. The turbinates are normal. The septum was deviated some to the right anteriorly, but not overly obstructive.  No polyps, masses, or purulence noted on examination.  Neck -  Soft. Non-tender. Palpation of the occipital, submental, submandibular, internal jugular chain, and supraclavicular nodes did not demonstrate any abnormal lymph nodes or masses. No parotid masses. Palpation of the thyroid was soft and smooth, with no nodules or goiter appreciated.  The trachea was mobile and midline.        A/P - Ralph Nieves is a 37 year old male with snoring. It doesn't sound like he has sleep apnea. He can consider a sleep medicine referral and sleep study to differentiate between obstructive sleep apnea and primary snoring. We discussed conservative management including weight loss, sleeping on side, and a dental appliance, as  well as CPAP if sleep apnea is detected, or possibly UPPP if he has sleep apnea. He doesn't really have nasal obstruction. Return if he wants to consider surgical options.     Pete Guerrero MD  Otolaryngology  [facility]          Again, thank you for allowing me to participate in the care of your patient.        Sincerely,        Pete Guerrero MD

## 2022-09-01 ENCOUNTER — VIRTUAL VISIT (OUTPATIENT)
Dept: FAMILY MEDICINE | Facility: CLINIC | Age: 37
End: 2022-09-01
Payer: COMMERCIAL

## 2022-09-01 DIAGNOSIS — F32.A ANXIETY AND DEPRESSION: ICD-10-CM

## 2022-09-01 DIAGNOSIS — B00.9 HSV-1 (HERPES SIMPLEX VIRUS 1) INFECTION: Primary | ICD-10-CM

## 2022-09-01 DIAGNOSIS — F41.9 ANXIETY AND DEPRESSION: ICD-10-CM

## 2022-09-01 RX ORDER — ESCITALOPRAM OXALATE 20 MG/1
20 TABLET ORAL DAILY
Qty: 90 TABLET | Refills: 3 | Status: SHIPPED | OUTPATIENT
Start: 2022-09-01 | End: 2023-06-05

## 2022-09-01 RX ORDER — VALACYCLOVIR HYDROCHLORIDE 1 G/1
1000 TABLET, FILM COATED ORAL 2 TIMES DAILY
Qty: 10 TABLET | Refills: 4 | Status: SHIPPED | OUTPATIENT
Start: 2022-09-01 | End: 2023-08-18

## 2022-09-01 NOTE — PROGRESS NOTES
"Ralph is a 37 year old who is being evaluated via a billable video visit.      How would you like to obtain your AVS? MyChart  If the video visit is dropped, the invitation should be resent by: Send to e-mail at: [email or web address]  Will anyone else be joining your video visit? No    Subjective   Ralph (MARE) is a 37 year old male presenting for the following health issues:  Recheck Medication (Talk about getting a new med)    AMRE is taking escitalopram 20 mg daily for anxiety and depression.  We increased this dose in approximately 4 months ago and it is going well.  He feels it is the appropriate dose with no side effects.    MARE has a HSV-1 lesion on his upper right eyelid, he describes it as red, dry and crusty.  HE has no vision change associated with this, he recently had an eye exam and no changes from that.  He has had these \"all his life,\" and he has been treated with valcyclovir with good results.  He feels they are brought on by fatigue and stress, he is having to change work hours due to working across time zones and has been up later than usual.      Review of Systems   Constitutional, HEENT, cardiovascular, pulmonary, GI, , musculoskeletal, neuro, skin, endocrine and psych systems are negative, except as otherwise noted.      Objective       Vitals:  No vitals were obtained today due to virtual visit.    Physical Exam   GENERAL: Healthy, alert and no distress  EYES: Eyes grossly normal to inspection, except for possibly visualizing a red area on the right upper eyelid.  No discharge, or obvious scleral/conjunctival abnormalities.  RESP: No audible wheeze, cough, or visible cyanosis.  No visible retractions or increased work of breathing.    SKIN: Visible skin clear. No significant rash, abnormal pigmentation or lesions.  NEURO: Cranial nerves grossly intact.  Mentation and speech appropriate for age.  PSYCH: Mentation appears normal, affect normal/bright, judgement and insight intact, normal speech and " appearance well-groomed.    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      Video-Visit Details    Video Start Time: 0900    Type of service:  Video Visit    Video End Time:9:24 AM    Originating Location (pt. Location): Home    Distant Location (provider location):  [facility]     Platform used for Video Visit: Rita Ding.

## 2022-09-24 ENCOUNTER — HEALTH MAINTENANCE LETTER (OUTPATIENT)
Age: 37
End: 2022-09-24

## 2022-10-27 ENCOUNTER — OFFICE VISIT (OUTPATIENT)
Dept: FAMILY MEDICINE | Facility: CLINIC | Age: 37
End: 2022-10-27
Payer: COMMERCIAL

## 2022-10-27 VITALS
DIASTOLIC BLOOD PRESSURE: 86 MMHG | WEIGHT: 216.8 LBS | SYSTOLIC BLOOD PRESSURE: 126 MMHG | HEART RATE: 74 BPM | HEIGHT: 70 IN | OXYGEN SATURATION: 96 % | BODY MASS INDEX: 31.04 KG/M2 | TEMPERATURE: 98.4 F

## 2022-10-27 DIAGNOSIS — J34.0 ULCER OF MUCOSA OF NOSE: Primary | ICD-10-CM

## 2022-10-27 DIAGNOSIS — Z30.09 ENCOUNTER FOR OTHER GENERAL COUNSELING OR ADVICE ON CONTRACEPTION: ICD-10-CM

## 2022-10-27 PROCEDURE — 87186 SC STD MICRODIL/AGAR DIL: CPT | Performed by: NURSE PRACTITIONER

## 2022-10-27 PROCEDURE — 87077 CULTURE AEROBIC IDENTIFY: CPT | Performed by: NURSE PRACTITIONER

## 2022-10-27 NOTE — NURSING NOTE
"ROOM:3  ALEXEY PADILLA    Preferred Name: Ralph CARLSON AGREED:                DECLINED:              37 year old  Chief Complaint   Patient presents with     Nose Problem     Right nostril, Crusty, feels like a booger to not pick, scab in nostril that doesn't come off or move, stings, every couple of weeks       Blood pressure 126/86, pulse 74, temperature 98.4  F (36.9  C), temperature source Oral, height 1.781 m (5' 10.1\"), weight 98.3 kg (216 lb 12.8 oz), SpO2 96 %. Body mass index is 31.02 kg/m .  BP completed using cuff size:        There is no problem list on file for this patient.      Wt Readings from Last 2 Encounters:   10/27/22 98.3 kg (216 lb 12.8 oz)   10/11/21 94.4 kg (208 lb 3.2 oz)     BP Readings from Last 3 Encounters:   10/27/22 126/86   05/31/22 (!) 149/87   10/11/21 (!) 159/102       No Known Allergies    Current Outpatient Medications   Medication     escitalopram (LEXAPRO) 20 MG tablet     FINASTERIDE PO     valACYclovir (VALTREX) 1000 mg tablet     No current facility-administered medications for this visit.       Social History     Tobacco Use     Smoking status: Never     Smokeless tobacco: Never   Substance Use Topics     Alcohol use: Not Currently     Comment: none     Drug use: Never       Honoring Choices - Health Care Directive Guide offered to patient at time of visit.    Health Maintenance Due   Topic Date Due     ADVANCE CARE PLANNING  Never done     HEPATITIS B IMMUNIZATION (1 of 3 - 3-dose series) Never done     HIV SCREENING  Never done     HEPATITIS C SCREENING  Never done     DTAP/TDAP/TD IMMUNIZATION (1 - Tdap) Never done     YEARLY PREVENTIVE VISIT  09/23/2021     COVID-19 Vaccine (4 - Booster for Sol series) 09/24/2022       Immunization History   Administered Date(s) Administered     COVID-19,PF,Sol 04/11/2021     COVID-19,PF,Pfizer (12+ Yrs) 10/29/2021     COVID-19,PF,Pfizer 12+ Yrs (2022 and After) 07/30/2022     Influenza Vaccine IM > 6 months Valent IIV4 " (Alfuria,Fluzone) 09/15/2018, 09/23/2020, 09/15/2021       No results found for: PAP    Recent Labs   Lab Test 09/23/20  1306   *   HDL 35*   TRIG 292*   ALT 24   CR 0.79   GFRESTIMATED >90   GFRESTBLACK >90   ALBUMIN 4.2   POTASSIUM 4.3   TSH 1.23       PHQ-2 ( 1999 Pfizer) 9/1/2022 5/23/2022   Q1: Little interest or pleasure in doing things 0 1   Q2: Feeling down, depressed or hopeless 0 1   PHQ-2 Score 0 2       PHQ-9 SCORE 9/23/2020 10/11/2021 11/2/2021   PHQ-9 Total Score MyChart - - 8 (Mild depression)   PHQ-9 Total Score 6 18 8       CARROL-7 SCORE 9/23/2020 10/11/2021 11/2/2021   Total Score - - 7 (mild anxiety)   Total Score 9 19 7       No flowsheet data found.    Christiana Carrizales    October 27, 2022 8:15 AM

## 2022-10-27 NOTE — PROGRESS NOTES
Ralph Nieves is a 37 year old male who presents today with 2 concerns:    1.)  He has had a right nares lesion off and on for more than a year.  This reoccurs about every 2-3 weeks, than goes away.  His partner often gets the same symptom after he has had this lesion.  It starts out as a red, scabbed and painful lesion on the septum area of the right nares.  It crusts over, he sometimes picks at it but it is difficult to remove the scab from his nose.  It then softens and resolves then reoccurs in 2-3 weeks.  There are no other symptoms, no fever, no upper respiratory symptoms.      There was a exposure to a friend who had a staff infection, there is some concern for that.  This seems to always be contagious and his partner gets the same thing.  They have not treated this with anything in the past.      2.)  MARE and his partner have discussed their desire for sterilization.  They do not want to have children, they are both sure of that.  MARE would like a referral for a vasectomy.    Review Of Systems   ROS: 10 point ROS neg other than the symptoms noted above in the HPI.    History reviewed. No pertinent past medical history.  Past Surgical History:   Procedure Laterality Date     APPENDECTOMY  2015    Ruptured in [place]     XR KNEE SURGERY HENRIQUE LEFT       Social History     Socioeconomic History     Marital status: Single     Spouse name: Not on file     Number of children: Not on file     Years of education: Not on file     Highest education level: Not on file   Occupational History     Not on file   Tobacco Use     Smoking status: Never     Smokeless tobacco: Never   Substance and Sexual Activity     Alcohol use: Not Currently     Comment: none     Drug use: Never     Sexual activity: Yes     Partners: Female     Birth control/protection: I.U.D.   Other Topics Concern     Not on file   Social History Narrative     Not on file     Social Determinants of Health     Financial Resource Strain: Not on file   Food  "Insecurity: Not on file   Transportation Needs: Not on file   Physical Activity: Not on file   Stress: Not on file   Social Connections: Not on file   Intimate Partner Violence: Not on file   Housing Stability: Not on file     Family History   Problem Relation Age of Onset     No Known Problems Mother      No Known Problems Father      Diabetes Maternal Grandfather        /86   Pulse 74   Temp 98.4  F (36.9  C) (Oral)   Ht 1.781 m (5' 10.1\")   Wt 98.3 kg (216 lb 12.8 oz)   SpO2 96%   BMI 31.02 kg/m      Exam:  Constitutional: healthy, alert and no distress  Head: Normocephalic. No masses, lesions, tenderness or abnormalities  ENT: Left nares intact, normal.  Right nares, noted mucoid discharge, erythematous mucosa, soft scab on septal surface just inside the nose.  Psychiatric: mentation appears normal and affect normal/bright    Assessment/Plan:  1. Ulcer of mucosa of nose    - Wound Aerobic Bacterial Culture Routine; Future    We will wait for the culture to determine correct treatment.  MARE is aware of that and we will follow up.      2. Encounter for other general counseling or advice on contraception    - Adult Urology  Referral; Future      Options for treatment and follow-up care were reviewed with the patient. Patient engaged in the decision making process and verbalized understanding of the options discussed and agreed with the final plan.    "

## 2022-10-30 LAB — BACTERIA WND CULT: ABNORMAL

## 2022-10-30 NOTE — TELEPHONE ENCOUNTER
MEDICAL RECORDS REQUEST   [facility]  [address] [place] [address]  PHONE: [phone]  Fax: [phone]        FUTURE VISIT INFORMATION                                                   Ralph Nieves, : 1985 scheduled for future visit at [facility]    APPOINTMENT INFORMATION:    Date: 2023    Provider:  Pete Bustillo MD    Reason for Visit/Diagnosis: VASECTOMY CONSULT    REFERRAL INFORMATION:    Referring provider: Celeste Roy NP in [facility]    RECORDS REQUESTED FOR VISIT                                                     NOTES  STATUS/DETAILS   MEDICATION LIST  yes     PRE-VISIT CHECKLIST      Record collection complete Yes   Appointment appropriately scheduled           (right time/right provider) Yes   Joint diagnostic appointment coordinated correctly          (ensure right order & amount of time) Yes   MyChart activation Yes   Questionnaire complete If no, please explain pending

## 2022-10-31 DIAGNOSIS — L08.9 LOCAL INFECTION OF SKIN AND SUBCUTANEOUS TISSUE: Primary | ICD-10-CM

## 2022-10-31 RX ORDER — CEPHALEXIN 500 MG/1
500 CAPSULE ORAL 3 TIMES DAILY
Qty: 30 CAPSULE | Refills: 0 | Status: SHIPPED | OUTPATIENT
Start: 2022-10-31

## 2023-01-29 ENCOUNTER — HEALTH MAINTENANCE LETTER (OUTPATIENT)
Age: 38
End: 2023-01-29

## 2023-02-02 ENCOUNTER — OFFICE VISIT (OUTPATIENT)
Dept: FAMILY MEDICINE | Facility: CLINIC | Age: 38
End: 2023-02-02
Payer: COMMERCIAL

## 2023-02-02 VITALS
RESPIRATION RATE: 16 BRPM | DIASTOLIC BLOOD PRESSURE: 81 MMHG | WEIGHT: 221.3 LBS | BODY MASS INDEX: 31.66 KG/M2 | TEMPERATURE: 98.1 F | OXYGEN SATURATION: 96 % | HEART RATE: 76 BPM | SYSTOLIC BLOOD PRESSURE: 118 MMHG

## 2023-02-02 DIAGNOSIS — M77.8 RIGHT ELBOW TENDONITIS: Primary | ICD-10-CM

## 2023-02-02 ASSESSMENT — PAIN SCALES - GENERAL: PAINLEVEL: SEVERE PAIN (6)

## 2023-02-02 NOTE — PROGRESS NOTES
Ralph Nieves is a 37 year old male who presents today, accompanied by his partner Faviola Cunha,  with fairly constant pain in his right elbow.  4 months ago he was playing pickle ball quite rigorously and noticed this pain after that episode.  He has not played since, he did not fall or injure his elbow that way.  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  The pain is with range of motion and even wakes him up at night.  He denies swelling, warmth or redness of the joint.  No fever. No numbness or tingling of arm, hand or fingers. He has tried ibuprofen with very limited relief, he has used cold packs at times, he has used a compression support on his arm, all with limited success as well.    Review Of Systems   ROS: 10 point ROS neg other than the symptoms noted above in the HPI.      History reviewed. No pertinent past medical history.  Past Surgical History:   Procedure Laterality Date     APPENDECTOMY  2015    Ruptured in [place]     XR KNEE SURGERY HENRIQUE LEFT       Social History     Socioeconomic History     Marital status: Single     Spouse name: Not on file     Number of children: Not on file     Years of education: Not on file     Highest education level: Not on file   Occupational History     Not on file   Tobacco Use     Smoking status: Never     Smokeless tobacco: Never   Substance and Sexual Activity     Alcohol use: Not Currently     Comment: none     Drug use: Never     Sexual activity: Yes     Partners: Female     Birth control/protection: I.U.D.   Other Topics Concern     Not on file   Social History Narrative     Not on file     Social Determinants of Health     Financial Resource Strain: Not on file   Food Insecurity: Not on file   Transportation Needs: Not on file   Physical Activity: Not on file   Stress: Not on file   Social Connections: Not on file   Intimate Partner Violence: Not on file   Housing Stability: Not on file     Family History   Problem Relation Age  "of Onset     No Known Problems Mother      No Known Problems Father      Diabetes Maternal Grandfather        /81 (BP Location: Left arm, Patient Position: Sitting, Cuff Size: Adult Large)   Pulse 76   Temp 98.1  F (36.7  C) (Oral)   Resp 16   Wt 100.4 kg (221 lb 4.8 oz)   SpO2 96%   BMI 31.66 kg/m      Exam:  Constitutional: healthy, alert and mild distress  Musculoskeletal: Flexion/extension of right elbow elicits tenderness, lateral epicondyl, deep palpation painful.  No nerve symptoms   Neurologic: Gait normal. Reflexes normal and symmetric. Sensation grossly WNL.  Psychiatric: mentation appears normal and affect normal/bright    Assessment/Plan:  1. Right elbow (lateral) tendonitis    - Occupational Therapy Referral; Future    Ice, alternating with heat.  Consider Voltaren cream.  Consider \"tennis elbow\"brace.      Follow up as needed after therapy.      Options for treatment and follow-up care were reviewed with the patient. Patient engaged in the decision making process and verbalized understanding of the options discussed and agreed with the final plan.    "

## 2023-02-02 NOTE — NURSING NOTE
ROOM:2  RANDY ALEXEY CHAVA    Preferred Name: Ralph     How did you hear about us?  Current Patient    37 year old  Chief Complaint   Patient presents with     Elbow Pain     Few months, has gotten worse        Blood pressure 118/81, pulse 76, temperature 98.1  F (36.7  C), temperature source Oral, resp. rate 16, weight 100.4 kg (221 lb 4.8 oz), SpO2 96 %. Body mass index is 31.66 kg/m .  BP completed using cuff size:        There is no problem list on file for this patient.      Wt Readings from Last 2 Encounters:   02/02/23 100.4 kg (221 lb 4.8 oz)   10/27/22 98.3 kg (216 lb 12.8 oz)     BP Readings from Last 3 Encounters:   02/02/23 118/81   10/27/22 126/86   05/31/22 (!) 149/87       No Known Allergies    Current Outpatient Medications   Medication     cephALEXin (KEFLEX) 500 MG capsule     escitalopram (LEXAPRO) 20 MG tablet     FINASTERIDE PO     valACYclovir (VALTREX) 1000 mg tablet     No current facility-administered medications for this visit.       Social History     Tobacco Use     Smoking status: Never     Smokeless tobacco: Never   Substance Use Topics     Alcohol use: Not Currently     Comment: none     Drug use: Never       Honoring Choices - Health Care Directive Guide offered to patient at time of visit.    Health Maintenance Due   Topic Date Due     ADVANCE CARE PLANNING  Never done     HEPATITIS B IMMUNIZATION (1 of 3 - 3-dose series) Never done     HIV SCREENING  Never done     HEPATITIS C SCREENING  Never done     DTAP/TDAP/TD IMMUNIZATION (1 - Tdap) Never done     YEARLY PREVENTIVE VISIT  09/23/2021       Immunization History   Administered Date(s) Administered     COVID-19 Vaccine (Sol) 04/11/2021     COVID-19 Vaccine 12+ (Pfizer 2022) 07/30/2022     COVID-19 Vaccine 12+ (Pfizer) 10/29/2021     COVID-19 Vaccine Bivalent Booster 12+ (Pfizer) 11/04/2022     Influenza Vaccine >6 months (Alfuria,Fluzone) 09/15/2018, 09/23/2020, 09/15/2021       No results found for: PAP    Recent Labs   Lab Test  09/23/20  1306   *   HDL 35*   TRIG 292*   ALT 24   CR 0.79   GFRESTIMATED >90   GFRESTBLACK >90   ALBUMIN 4.2   POTASSIUM 4.3   TSH 1.23       PHQ-2 ( 1999 Pfizer) 2/2/2023 9/1/2022   Q1: Little interest or pleasure in doing things 0 0   Q2: Feeling down, depressed or hopeless 0 0   PHQ-2 Score 0 0       PHQ-9 SCORE 9/23/2020 10/11/2021 11/2/2021   PHQ-9 Total Score MyChart - - 8 (Mild depression)   PHQ-9 Total Score 6 18 8       CARROL-7 SCORE 9/23/2020 10/11/2021 11/2/2021   Total Score - - 7 (mild anxiety)   Total Score 9 19 7       No flowsheet data found.    Amanda Bourgeois, EMT    February 2, 2023 9:57 AM

## 2023-02-09 ENCOUNTER — OFFICE VISIT (OUTPATIENT)
Dept: UROLOGY | Facility: CLINIC | Age: 38
End: 2023-02-09
Attending: NURSE PRACTITIONER
Payer: COMMERCIAL

## 2023-02-09 ENCOUNTER — PRE VISIT (OUTPATIENT)
Dept: UROLOGY | Facility: CLINIC | Age: 38
End: 2023-02-09

## 2023-02-09 VITALS
HEIGHT: 70 IN | HEART RATE: 52 BPM | BODY MASS INDEX: 31.5 KG/M2 | WEIGHT: 220 LBS | SYSTOLIC BLOOD PRESSURE: 126 MMHG | DIASTOLIC BLOOD PRESSURE: 87 MMHG

## 2023-02-09 DIAGNOSIS — Z30.09 ENCOUNTER FOR OTHER GENERAL COUNSELING OR ADVICE ON CONTRACEPTION: ICD-10-CM

## 2023-02-09 PROCEDURE — 99202 OFFICE O/P NEW SF 15 MIN: CPT | Performed by: UROLOGY

## 2023-02-09 NOTE — NURSING NOTE
"Chief Complaint   Patient presents with     Consult     Vasectomy       Height 1.778 m (5' 10\"), weight 99.8 kg (220 lb). Body mass index is 31.57 kg/m .    There is no problem list on file for this patient.      No Known Allergies    Current Outpatient Medications   Medication Sig Dispense Refill     escitalopram (LEXAPRO) 20 MG tablet Take 1 tablet (20 mg) by mouth daily 90 tablet 3     FINASTERIDE PO Take 1.25 mg by mouth daily        valACYclovir (VALTREX) 1000 mg tablet Take 1 tablet (1,000 mg) by mouth 2 times daily 10 tablet 4     cephALEXin (KEFLEX) 500 MG capsule Take 1 capsule (500 mg) by mouth 3 times daily 30 capsule 0       Social History     Tobacco Use     Smoking status: Never     Smokeless tobacco: Never   Substance Use Topics     Alcohol use: Not Currently     Comment: none     Drug use: Never       Honorio Williamson EMT  2/9/2023  9:53 AM  "

## 2023-02-09 NOTE — PROGRESS NOTES
"CC: Desires sterilization, consult for vasectomy from Dr. Celeste Roy     HPI: Ralph Nieves is a 37 year old male with no children, and he is intersted in getting a vasectomy for sterilization.         PMH:   Lexapro for mood  Finasteride for hair  Valtrex for facial HSV outbreaks.    Past Surgical History:   Procedure Laterality Date     APPENDECTOMY  2015    Ruptured in [place]     XR KNEE SURGERY HENRIQUE LEFT     ACL      FAMILY HX:   Family History   Problem Relation Age of Onset     No Known Problems Mother      No Known Problems Father      Diabetes Maternal Grandfather       No history of coagulopathies.      ALLERGIES:    No Known Allergies    MEDS:   Current Outpatient Medications   Medication Sig     escitalopram (LEXAPRO) 20 MG tablet Take 1 tablet (20 mg) by mouth daily     FINASTERIDE PO Take 1.25 mg by mouth daily      valACYclovir (VALTREX) 1000 mg tablet Take 1 tablet (1,000 mg) by mouth 2 times daily     cephALEXin (KEFLEX) 500 MG capsule Take 1 capsule (500 mg) by mouth 3 times daily     No current facility-administered medications for this visit.       SOCIAL HISTORY:  Social History     Tobacco Use     Smoking status: Never     Smokeless tobacco: Never   Substance Use Topics     Alcohol use: Not Currently     Comment: none     Drug use: Never        GENERAL PHYSICAL EXAM:   /87   Pulse 52   Ht 1.778 m (5' 10\")   Wt 99.8 kg (220 lb)   BMI 31.57 kg/m       Constitutional: No acute distress. Well nourished.   PSYCH: Normal mood and affect.   NEURO: Normal gait, no focal deficits.   EYES: Anicteric, EOMI, PERR  CARDIOPULMONARY: Breathing non-labored, pulse regular, no peripheral edema.   GI: Abdomen soft, non-tender, nondistended   MUSCULOSKELETAL: Normal limb proportions, no muscle wasting, no contractures.    SKIN: Normal virilized hair distribution, no lesions, warts or rashes over genitalia, abdomen extremities or face.   HEME/LYMPH: No echymosis, no lymphadenopathy in groin, no " lymphedema.     EXAM:  Phallus normal    Testes descended, consistency is normal. No intra-testicular masses.  Epididymes present.  Vas present bilateraly, both very easy to find.  THOMAS deferred.        I discussed with him at length the risks and benefits of the procedure. He understands that this is a sterilization procedure, and not reversible contraception. He understands that reversals, while possible, are not guaranteed to work and fairly complex. I discussed with him the option of sperm cryopreservation.     I stressed that he continues to be fertile in the post-operative period, and that he should continue using other contraceptive methods, such as a condom, until he obtains a semen analysis and we review the results to confirm success of the procedure and infertility. I also stressed to him that recanalization and pregnancy can occur in about 1 per thousand cases, possibly more even after we clear him with a semen analysis showing no motile sperm. I counseled him on the jennifer-operative risks of bleeding, infection, pain.  I described to him post-vasectomy pain syndrome that can occur in about 1 to 2% of men undergoing vasectomy.     We also discussed recovery times (typically days if no complications) and post-operative care including use of ice packs, pain medication and wound care.      Schedule vasectomy procedure in clinic.       Additional Coding Information:    Problems:  one acute uncomplicated illness or injury    Data Reviewed  Minimal or none    Level of risk:   low risk (e.g., OTC medication or observation, minor surgery without risks)    Time spent:    11 minutes spent on the date of the encounter doing chart review, history and exam, documentation and further activities as noted above.

## 2023-02-09 NOTE — LETTER
"2/9/2023       RE: Ralph Nieves  [address]  [facility] [address]     Dear Colleague,    Thank you for referring your patient, Ralph Nieves, to the [facility] [place] [facility]. Please see a copy of my visit note below.    CC: Desires sterilization, consult for vasectomy from Dr. Celeste Roy     HPI: Ralph Nieves is a 37 year old male with no children, and he is intersted in getting a vasectomy for sterilization.         PMH:   Lexapro for mood  Finasteride for hair  Valtrex for facial HSV outbreaks.    Past Surgical History:   Procedure Laterality Date     APPENDECTOMY  2015    Ruptured in [place]     XR KNEE SURGERY HENRIQUE LEFT     ACL      FAMILY HX:   Family History   Problem Relation Age of Onset     No Known Problems Mother      No Known Problems Father      Diabetes Maternal Grandfather       No history of coagulopathies.      ALLERGIES:    No Known Allergies    MEDS:   Current Outpatient Medications   Medication Sig     escitalopram (LEXAPRO) 20 MG tablet Take 1 tablet (20 mg) by mouth daily     FINASTERIDE PO Take 1.25 mg by mouth daily      valACYclovir (VALTREX) 1000 mg tablet Take 1 tablet (1,000 mg) by mouth 2 times daily     cephALEXin (KEFLEX) 500 MG capsule Take 1 capsule (500 mg) by mouth 3 times daily     No current facility-administered medications for this visit.       SOCIAL HISTORY:  Social History     Tobacco Use     Smoking status: Never     Smokeless tobacco: Never   Substance Use Topics     Alcohol use: Not Currently     Comment: none     Drug use: Never        GENERAL PHYSICAL EXAM:   /87   Pulse 52   Ht 1.778 m (5' 10\")   Wt 99.8 kg (220 lb)   BMI 31.57 kg/m       Constitutional: No acute distress. Well nourished.   PSYCH: Normal mood and affect.   NEURO: Normal gait, no focal deficits.   EYES: Anicteric, EOMI, PERR  CARDIOPULMONARY: Breathing non-labored, pulse regular, no peripheral edema. "   GI: Abdomen soft, non-tender, nondistended   MUSCULOSKELETAL: Normal limb proportions, no muscle wasting, no contractures.    SKIN: Normal virilized hair distribution, no lesions, warts or rashes over genitalia, abdomen extremities or face.   HEME/LYMPH: No echymosis, no lymphadenopathy in groin, no lymphedema.     EXAM:  Phallus normal    Testes descended, consistency is normal. No intra-testicular masses.  Epididymes present.  Vas present bilateraly, both very easy to find.  THOMAS deferred.        I discussed with him at length the risks and benefits of the procedure. He understands that this is a sterilization procedure, and not reversible contraception. He understands that reversals, while possible, are not guaranteed to work and fairly complex. I discussed with him the option of sperm cryopreservation.     I stressed that he continues to be fertile in the post-operative period, and that he should continue using other contraceptive methods, such as a condom, until he obtains a semen analysis and we review the results to confirm success of the procedure and infertility. I also stressed to him that recanalization and pregnancy can occur in about 1 per thousand cases, possibly more even after we clear him with a semen analysis showing no motile sperm. I counseled him on the jennifer-operative risks of bleeding, infection, pain.  I described to him post-vasectomy pain syndrome that can occur in about 1 to 2% of men undergoing vasectomy.     We also discussed recovery times (typically days if no complications) and post-operative care including use of ice packs, pain medication and wound care.      Schedule vasectomy procedure in clinic.       Additional Coding Information:    Problems:  one acute uncomplicated illness or injury    Data Reviewed  Minimal or none    Level of risk:   low risk (e.g., OTC medication or observation, minor surgery without risks)    Time spent:    11 minutes spent on the date of the encounter  doing chart review, history and exam, documentation and further activities as noted above.     Pete Bustillo MD

## 2023-02-17 ENCOUNTER — THERAPY VISIT (OUTPATIENT)
Dept: OCCUPATIONAL THERAPY | Facility: CLINIC | Age: 38
End: 2023-02-17
Attending: NURSE PRACTITIONER
Payer: COMMERCIAL

## 2023-02-17 DIAGNOSIS — M77.8 RIGHT ELBOW TENDONITIS: ICD-10-CM

## 2023-02-17 PROCEDURE — 97140 MANUAL THERAPY 1/> REGIONS: CPT | Mod: GO

## 2023-02-17 PROCEDURE — 97165 OT EVAL LOW COMPLEX 30 MIN: CPT | Mod: GO

## 2023-02-17 PROCEDURE — 97110 THERAPEUTIC EXERCISES: CPT | Mod: GO

## 2023-02-17 PROCEDURE — 97535 SELF CARE MNGMENT TRAINING: CPT | Mod: GO

## 2023-02-17 NOTE — PROGRESS NOTES
[facility] Initial Evaluation     Current Date: 2/17/2023    Diagnosis: R elbow pain/LEP   DOI: December 2022  Referring Provider: Celeste Roy NP    Subjective:  No past medical history on file.  No Known Allergies    Current Outpatient Medications:      cephALEXin (KEFLEX) 500 MG capsule, Take 1 capsule (500 mg) by mouth 3 times daily, Disp: 30 capsule, Rfl: 0     escitalopram (LEXAPRO) 20 MG tablet, Take 1 tablet (20 mg) by mouth daily, Disp: 90 tablet, Rfl: 3     FINASTERIDE PO, Take 1.25 mg by mouth daily , Disp: , Rfl:      valACYclovir (VALTREX) 1000 mg tablet, Take 1 tablet (1,000 mg) by mouth 2 times daily, Disp: 10 tablet, Rfl: 4    Patient reports onset in December 2022 after playing pickleball with his uncle.     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    Functional Outcome Measure:   Upper Extremity Functional Index Score:  SCORE:   Column Totals: 30/80:   (A lower score indicates greater disability.)      Objective:  Pain Level (Scale 0-10)   2/17/2023   At Rest 4   With Use 6     Pain Description  Date 2/17/2023   Location elbow   Pain Quality Aching, Sharp and Throbbing, pressure   Frequency constant     Pain is worst  daytime or nighttime   Exacerbated by  lifting, reaching, gripping   Relieved by rest   Progression Unchanged or gradually worsening     Posture  Normal    Sensation  WNL throughout all nerve distributions; per patient report    ROM    Wilian elbow and wrist AROM WNL, pt reports sensation of pulling in wrist flexion on R    Resisted Testing  Pain  Report:  - none    + mild    ++ moderate    +++ severe    2/17/2023   Elbow Extension -   Elbow Flexion +   Supination  +   Pronation -   Wrist Ext with RD, Elbow at side ++   Wrist Ext with UD, Elbow at side +   Wrist Ext  +   Wrist Flex  +   Long Finger Test +     Neural Tension Testing  RNT: Radial Neurodynamic Test (based on JORGE LUIS Bradley's ULNT)   2/17/2023   0-5 Scale 5   Position:   0/5: Arm across abdomen in coronal plane  1/5: Depress shoulder, ER to neutral ABD shoulder to 45 degrees  2/5: IR shoulder to end range, keep elbow at 90 degrees  3/5: Extend elbow to 0 degrees  4/5: Fully pronate forearm  5/5: Flex wrist and fingers with UD  Notes:    (+) indicates beyond grade level but less than [facility] to next level    (-) indicates over [facility] to level    S1  onset/change of patient's symptoms    S2 definite stop point based on patient's discomfort level    Strength   (Measured in pounds)  Pain Report: - none  + mild    ++ moderate    +++ severe    2/17/2023 2/17/2023   Trials L R   1  2  3 80 39   Average         2/17/2023 2/17/2023    L R   Elbow Ext 55 32     Palpation  Pain Report:  - none    + mild    ++ moderate    +++ severe    2/17/2023   Distal Triceps -   Anconeus -   ECRB at LEP ++   ECU at LEP +   EDC at LEP +   Radial Head -   Extensor Wad +   PIN Site +     Assessment:  Patient presents with symptoms consistent with diagnosis of right elbow lateral epicondylitis, with conservative intervention.     Patient's limitations or Problem List includes:  Pain, Weakness, Decreased  and Tightness in musculature of the right elbow, wrist and hand which interferes with the patient's ability to perform Self Care Tasks, Work Tasks, Sleep Patterns and Household Chores as compared to previous level of function.    Rehab Potential:  Excellent - Return to full activity, no limitations    Patient will benefit from skilled Occupational Therapy to increase  strength and activity tolerance and decrease  pain to return to previous activity level and resume normal daily tasks and to reach their rehab potential.    Barriers to Learning:  No barrier    Communication Issues:  Patient appears to be able to clearly communicate and understand verbal and written communication and follow directions correctly.    Chart Review: Chart Review, Brief history including review of medical and/or therapy records relating to the presenting problem and Simple history review with patient    Identified Performance Deficits: bathing/showering, care of pets, driving and community mobility, home establishment and management, meal preparation and cleanup, shopping, sleep, work and leisure activities    Assessment of Occupational Performance:  5 or more Performance Deficits    Clinical Decision Making (Complexity): Low complexity    Treatment Explanation:  The following has been discussed with the patient:  RX ordered/plan of care  Anticipated outcomes  Possible risks and side effects    Plan:  Frequency:  1 X week, once daily  Duration:  for 8 weeks    Treatment Plan:    Modalities:    US and Iontophoresis   Therapeutic Exercise:    AROM, PROM, Isotonics, Isometrics and Eccentrics  Therapeutic Activities:  Functional activities   Ergonomic work positioning  Neuromuscular re-ed:   Kinesiotaping  Manual Techniques:   Friction massage and Myofascial release  Orthotic Fabrication:    Static  Self Care:    Self Care Tasks, Ergonomic Considerations and Work Tasks    Discharge Plan:  Achieve all LTG.  Independent in home treatment program.  Reach maximal therapeutic benefit.    Home Program:   Warmth for stiffness  Ice after activity for pain  PROM with stretch to wrist extensors   TFM to LEP  MFR to extensors, avoiding PIN site  Wrist cock up orthosis during activity  Compression sleeve per comfort  Avoid activities that exacerbate pain in the elbow  Lift with elbows at side and forearms in neutral/supinated position  Wrist flex/ext AROM - gravity  reduced    Next Visit:  TFM to LEP  MFR to extensors  Progress HEP as tolerated  Review activitiy mods/apply to work and IADLs

## 2023-02-17 NOTE — PROGRESS NOTES
[facility]    OUTPATIENT Occupational Therapy ORTHOPEDIC EVALUATION  PLAN OF TREATMENT FOR OUTPATIENT REHABILITATION  (COMPLETE FOR INITIAL CLAIMS ONLY)  Patient's Last Name, First Name, M.I.  YOB: 1985  Ralph Nieves    Provider s Name:  [facility]   Medical Record No.  [id number]   Start of Care Date:   2/17/23   Onset Date:   12/01/22   Treatment Diagnosis:  R elbow pain/LEP Medical Diagnosis:  Right elbow tendonitis       Goals:     02/17/23 0500   Goal #1   Goal #1 household chores   Previous Performance Level Independent   Current Functional Task    Current Performance Level high pain   STG Target Perfomance Open a tight or new jar   STG Target Perform Level mod or less pain in attempt   Due Date 03/17/23   LTG Target Task/Performance Pain free household chores   Due Date 04/14/23         Therapy Frequency:   1x/wk  Predicted Duration of Therapy Intervention:   8 weeks    Jeri Guy, OTR               Celeste Roy, NP    I CERTIFY THE NEED FOR THESE SERVICES FURNISHED UNDER        THIS PLAN OF TREATMENT AND WHILE UNDER MY CARE     (Physician attestation of this document indicates review and certification of the therapy plan).                     Certification Date From:    2/17/23  Certification Date To:   4/14/23    Referring Provider:  Celeste Roy    Initial Assessment        See Epic Evaluation

## 2023-02-18 ENCOUNTER — MYC REFILL (OUTPATIENT)
Dept: FAMILY MEDICINE | Facility: CLINIC | Age: 38
End: 2023-02-18

## 2023-02-18 DIAGNOSIS — L08.9 LOCAL INFECTION OF SKIN AND SUBCUTANEOUS TISSUE: ICD-10-CM

## 2023-02-18 RX ORDER — CEPHALEXIN 500 MG/1
500 CAPSULE ORAL 3 TIMES DAILY
Qty: 30 CAPSULE | Refills: 0 | Status: CANCELLED | OUTPATIENT
Start: 2023-02-18

## 2023-02-26 DIAGNOSIS — L08.9 LOCAL INFECTION OF SKIN AND SUBCUTANEOUS TISSUE: ICD-10-CM

## 2023-03-02 RX ORDER — CEPHALEXIN 500 MG/1
CAPSULE ORAL
Qty: 30 CAPSULE | Refills: 0 | OUTPATIENT
Start: 2023-03-02

## 2023-03-02 NOTE — TELEPHONE ENCOUNTER
CEPHALEXIN 500MG CAPSULES    Last Written Prescription Date:  10/31/22  Last Fill Quantity: 30,   # refills: 0   Last Office Visit : 2/2/23  Future Office visit: none    Refused 2/20/23 Patient needs appointment  Requesting refill Cephalexin, message left by NP clinic staff for patient on 2/28/23

## 2023-06-02 ENCOUNTER — TELEPHONE (OUTPATIENT)
Dept: FAMILY MEDICINE | Facility: CLINIC | Age: 38
End: 2023-06-02

## 2023-06-02 NOTE — TELEPHONE ENCOUNTER
[facility] phone call message - patient requesting a refill:    Full Medication Name: escitalopram (LEXAPRO) 20 MG tablet    Dose: 20 mg      Pharmacy confirmed as     : Yes    Medication tab checked to see if medication has been sent  Yes    Is patient out of medication: Yes    Did patient contact the pharmacy? No:     Additional Comments:     Primary language: English      needed? No    Call taken on June 2, 2023 at 10:01 AM by Jose Ramon Palacio    Route to P [facility] MED REFILLS TEAM     ++If medication is a controlled substance, please route directly to the provider++

## 2023-06-05 DIAGNOSIS — F41.9 ANXIETY AND DEPRESSION: ICD-10-CM

## 2023-06-05 DIAGNOSIS — F32.A ANXIETY AND DEPRESSION: ICD-10-CM

## 2023-06-05 RX ORDER — ESCITALOPRAM OXALATE 20 MG/1
20 TABLET ORAL DAILY
Qty: 90 TABLET | Refills: 3 | Status: SHIPPED | OUTPATIENT
Start: 2023-06-05 | End: 2023-09-11

## 2023-07-31 ENCOUNTER — OFFICE VISIT (OUTPATIENT)
Dept: FAMILY MEDICINE | Facility: CLINIC | Age: 38
End: 2023-07-31
Payer: COMMERCIAL

## 2023-07-31 VITALS
DIASTOLIC BLOOD PRESSURE: 82 MMHG | TEMPERATURE: 98.4 F | OXYGEN SATURATION: 95 % | SYSTOLIC BLOOD PRESSURE: 124 MMHG | BODY MASS INDEX: 32.07 KG/M2 | HEART RATE: 66 BPM | WEIGHT: 224 LBS | HEIGHT: 70 IN

## 2023-07-31 DIAGNOSIS — R79.89 LOW VITAMIN D LEVEL: ICD-10-CM

## 2023-07-31 DIAGNOSIS — Z00.00 ROUTINE HISTORY AND PHYSICAL EXAMINATION OF ADULT: Primary | ICD-10-CM

## 2023-07-31 DIAGNOSIS — G47.33 OBSTRUCTIVE SLEEP APNEA SYNDROME: ICD-10-CM

## 2023-07-31 LAB
ALBUMIN SERPL BCG-MCNC: 5.1 G/DL (ref 3.5–5.2)
ALP SERPL-CCNC: 71 U/L (ref 40–129)
ALT SERPL W P-5'-P-CCNC: 26 U/L (ref 0–70)
ANION GAP SERPL CALCULATED.3IONS-SCNC: 12 MMOL/L (ref 7–15)
AST SERPL W P-5'-P-CCNC: 23 U/L (ref 0–45)
BASOPHILS # BLD AUTO: 0.1 10E3/UL (ref 0–0.2)
BASOPHILS NFR BLD AUTO: 1 %
BILIRUB SERPL-MCNC: 0.5 MG/DL
BUN SERPL-MCNC: 12.5 MG/DL (ref 6–20)
CALCIUM SERPL-MCNC: 10 MG/DL (ref 8.6–10)
CHLORIDE SERPL-SCNC: 103 MMOL/L (ref 98–107)
CHOLEST SERPL-MCNC: 302 MG/DL
CREAT SERPL-MCNC: 0.88 MG/DL (ref 0.67–1.17)
DEPRECATED HCO3 PLAS-SCNC: 23 MMOL/L (ref 22–29)
EOSINOPHIL # BLD AUTO: 0.1 10E3/UL (ref 0–0.7)
EOSINOPHIL NFR BLD AUTO: 2 %
ERYTHROCYTE [DISTWIDTH] IN BLOOD BY AUTOMATED COUNT: 12.9 % (ref 10–15)
GFR SERPL CREATININE-BSD FRML MDRD: >90 ML/MIN/1.73M2
GLUCOSE SERPL-MCNC: 90 MG/DL (ref 70–99)
HCT VFR BLD AUTO: 49.5 % (ref 40–53)
HDLC SERPL-MCNC: 34 MG/DL
HGB BLD-MCNC: 16.5 G/DL (ref 13.3–17.7)
IMM GRANULOCYTES # BLD: 0 10E3/UL
IMM GRANULOCYTES NFR BLD: 1 %
LDLC SERPL CALC-MCNC: 216 MG/DL
LYMPHOCYTES # BLD AUTO: 2.4 10E3/UL (ref 0.8–5.3)
LYMPHOCYTES NFR BLD AUTO: 40 %
MCH RBC QN AUTO: 30.4 PG (ref 26.5–33)
MCHC RBC AUTO-ENTMCNC: 33.3 G/DL (ref 31.5–36.5)
MCV RBC AUTO: 91 FL (ref 78–100)
MONOCYTES # BLD AUTO: 0.5 10E3/UL (ref 0–1.3)
MONOCYTES NFR BLD AUTO: 8 %
NEUTROPHILS # BLD AUTO: 2.8 10E3/UL (ref 1.6–8.3)
NEUTROPHILS NFR BLD AUTO: 48 %
NONHDLC SERPL-MCNC: 268 MG/DL
NRBC # BLD AUTO: 0 10E3/UL
NRBC BLD AUTO-RTO: 0 /100
PLATELET # BLD AUTO: 352 10E3/UL (ref 150–450)
POTASSIUM SERPL-SCNC: 4.4 MMOL/L (ref 3.4–5.3)
PROT SERPL-MCNC: 7.7 G/DL (ref 6.4–8.3)
RBC # BLD AUTO: 5.43 10E6/UL (ref 4.4–5.9)
SODIUM SERPL-SCNC: 138 MMOL/L (ref 136–145)
TRIGL SERPL-MCNC: 259 MG/DL
WBC # BLD AUTO: 5.9 10E3/UL (ref 4–11)

## 2023-07-31 PROCEDURE — 85025 COMPLETE CBC W/AUTO DIFF WBC: CPT | Mod: ORL | Performed by: NURSE PRACTITIONER

## 2023-07-31 PROCEDURE — 80053 COMPREHEN METABOLIC PANEL: CPT | Mod: ORL | Performed by: NURSE PRACTITIONER

## 2023-07-31 PROCEDURE — 82306 VITAMIN D 25 HYDROXY: CPT | Mod: ORL | Performed by: NURSE PRACTITIONER

## 2023-07-31 PROCEDURE — 80061 LIPID PANEL: CPT | Mod: ORL | Performed by: NURSE PRACTITIONER

## 2023-07-31 ASSESSMENT — ENCOUNTER SYMPTOMS
PALPITATIONS: 0
HEMATOCHEZIA: 0
NAUSEA: 0
FEVER: 0
ALLERGIC/IMMUNOLOGIC NEGATIVE: 1
JOINT SWELLING: 0
GASTROINTESTINAL NEGATIVE: 1
HEMATURIA: 0
APNEA: 1
ABDOMINAL PAIN: 0
HEADACHES: 0
SORE THROAT: 0
EYES NEGATIVE: 1
DIZZINESS: 0
EYE PAIN: 0
CHILLS: 0
NERVOUS/ANXIOUS: 1
ARTHRALGIAS: 0
HEMATOLOGIC/LYMPHATIC NEGATIVE: 1
ENDOCRINE NEGATIVE: 1
WEAKNESS: 0
PARESTHESIAS: 0
CARDIOVASCULAR NEGATIVE: 1
DYSURIA: 0
CONSTITUTIONAL NEGATIVE: 1
NEUROLOGICAL NEGATIVE: 1
COUGH: 0
FREQUENCY: 0
DIARRHEA: 0
SHORTNESS OF BREATH: 0
MYALGIAS: 0
HEARTBURN: 0
CONSTIPATION: 0
MUSCULOSKELETAL NEGATIVE: 1

## 2023-07-31 ASSESSMENT — ANXIETY QUESTIONNAIRES
6. BECOMING EASILY ANNOYED OR IRRITABLE: NOT AT ALL
1. FEELING NERVOUS, ANXIOUS, OR ON EDGE: SEVERAL DAYS
5. BEING SO RESTLESS THAT IT IS HARD TO SIT STILL: SEVERAL DAYS
GAD7 TOTAL SCORE: 5
IF YOU CHECKED OFF ANY PROBLEMS ON THIS QUESTIONNAIRE, HOW DIFFICULT HAVE THESE PROBLEMS MADE IT FOR YOU TO DO YOUR WORK, TAKE CARE OF THINGS AT HOME, OR GET ALONG WITH OTHER PEOPLE: SOMEWHAT DIFFICULT
GAD7 TOTAL SCORE: 5
7. FEELING AFRAID AS IF SOMETHING AWFUL MIGHT HAPPEN: NOT AT ALL
8. IF YOU CHECKED OFF ANY PROBLEMS, HOW DIFFICULT HAVE THESE MADE IT FOR YOU TO DO YOUR WORK, TAKE CARE OF THINGS AT HOME, OR GET ALONG WITH OTHER PEOPLE?: SOMEWHAT DIFFICULT
GAD7 TOTAL SCORE: 5
7. FEELING AFRAID AS IF SOMETHING AWFUL MIGHT HAPPEN: NOT AT ALL
4. TROUBLE RELAXING: SEVERAL DAYS
2. NOT BEING ABLE TO STOP OR CONTROL WORRYING: SEVERAL DAYS
3. WORRYING TOO MUCH ABOUT DIFFERENT THINGS: SEVERAL DAYS

## 2023-07-31 ASSESSMENT — PATIENT HEALTH QUESTIONNAIRE - PHQ9
SUM OF ALL RESPONSES TO PHQ QUESTIONS 1-9: 5
10. IF YOU CHECKED OFF ANY PROBLEMS, HOW DIFFICULT HAVE THESE PROBLEMS MADE IT FOR YOU TO DO YOUR WORK, TAKE CARE OF THINGS AT HOME, OR GET ALONG WITH OTHER PEOPLE: NOT DIFFICULT AT ALL
SUM OF ALL RESPONSES TO PHQ QUESTIONS 1-9: 5

## 2023-07-31 NOTE — PROGRESS NOTES
Answers for HPI/ROS submitted by the patient on 7/31/2023  If you checked off any problems, how difficult have these problems made it for you to do your work, take care of things at home, or get along with other people?: Not difficult at all  PHQ9 TOTAL SCORE: 5  CARROL 7 TOTAL SCORE: 5  Frequency of exercise:: 2-3 days/week  Getting at least 3 servings of Calcium per day:: NO  Diet:: Regular (no restrictions)  Taking medications regularly:: Not Applicable  Medication side effects:: Not applicable  Bi-annual eye exam:: Yes  Dental care twice a year:: Yes  Sleep apnea or symptoms of sleep apnea:: Excessive snoring  abdominal pain: No  Blood in stool: No  Blood in urine: No  chest pain: No  chills: No  congestion: No  constipation: No  cough: No  diarrhea: No  dizziness: No  ear pain: No  eye pain: No  nervous/anxious: Yes  fever: No  frequency: No  genital sores: No  headaches: No  hearing loss: No  heartburn: No  arthralgias: No  joint swelling: No  peripheral edema: No  mood changes: Yes  myalgias: No  nausea: No  dysuria: No  palpitations: No  Skin sensation changes: No  sore throat: No  urgency: No  rash: No  shortness of breath: No  visual disturbance: No  weakness: No  impotence: No  penile discharge: No  Additional concerns today:: No  Duration of exercise:: 15-30 minutes         ANNUAL WELLNESS EXAM     Today's Date: Jul 31, 2023     Patient Ralph Nieves 1985 presents to the clinic accompanied by his partner Faviola Cunha,  today for a preventative health visit.         SUBJECTIVE     History of Present Illness:  MARE is accompanied by his partner Destiny Cunha to discuss his snoring.  It has gotten to the point where it has made them sleep in separate spaces.  He has had this concern for more than a year or longer, he went to an ENT who basically said there was nothing they could do and they did not refer him for a sleep study.  His partner said he does wake up choking at times, the snoring has gotten  progressively worse over the past year.      MARE has used tapes, ending with duct tape to close his mouth and nose breathe.  He said this has helped him with his side of it, he has been to the point of using duct tape.        In reviewing labs from 2020 we found that he had low vitamin d and hyperlipidemia.    No Known Allergies     Current Outpatient Medications   Medication Instructions     cephALEXin (KEFLEX) 500 mg, Oral, 3 TIMES DAILY     escitalopram (LEXAPRO) 20 mg, Oral, DAILY     FINASTERIDE PO 1.25 mg, Oral, DAILY     valACYclovir (VALTREX) 1,000 mg, Oral, 2 TIMES DAILY       History reviewed. No pertinent past medical history.     Family History   Problem Relation Age of Onset     No Known Problems Mother      No Known Problems Father      Diabetes Maternal Grandfather         Do you have a first-degree relative with a history of the following:  A. Cancer of the prostate or intestine - No  B. Heart attack, heart pain, or stroke before the age of 55 - No  C. Unexplained death from drowning or car accident - No    Social History     Tobacco Use     Smoking status: Never     Smokeless tobacco: Never   Substance Use Topics     Alcohol use: Not Currently     Comment: none     Drug use: Never        History   Sexual Activity     Sexual activity: Yes     Partners: Female     Birth control/ protection: I.U.D.             10/11/2021     7:46 AM 11/2/2021    10:17 AM 7/31/2023     8:22 AM   PHQ   PHQ-9 Total Score 18 8 5   Q9: Thoughts of better off dead/self-harm past 2 weeks Not at all Not at all Not at all        Immunization History   Administered Date(s) Administered     COVID-19 Bivalent 12+ (Pfizer) 11/04/2022     COVID-19 MONOVALENT 12+ (Pfizer) 10/29/2021     COVID-19 Monovalent 12+ (Pfizer 2022) 07/30/2022     COVID-19 Vaccine (Sol) 04/11/2021     Influenza Vaccine >6 months (Alfuria,Fluzone) 09/15/2018, 09/23/2020, 09/15/2021        Health Maintenance Due   Topic Date Due     ADVANCE CARE PLANNING   "Never done     HEPATITIS B IMMUNIZATION (1 of 3 - 3-dose series) Never done     HIV SCREENING  Never done     HEPATITIS C SCREENING  Never done     DTAP/TDAP/TD IMMUNIZATION (1 - Tdap) Never done     YEARLY PREVENTIVE VISIT  09/23/2021      Health Maintenance components reviewed - Seasonal Influenza vaccine status is up to date & Covid-19 vaccine status is up to date.    Diet: in general, a \"healthy\" diet      Exercise: did not ask    Review of Systems   Constitutional: Negative.  Negative for chills and fever.   HENT: Negative for congestion, ear pain, hearing loss and sore throat.         Snoring and sleep apnea   Eyes: Negative.  Negative for pain and visual disturbance.   Respiratory: Positive for apnea. Negative for cough and shortness of breath.    Cardiovascular: Negative.  Negative for chest pain, palpitations and peripheral edema.   Gastrointestinal: Negative.  Negative for abdominal pain, constipation, diarrhea, heartburn, hematochezia and nausea.   Endocrine: Negative.    Genitourinary: Negative.  Negative for dysuria, frequency, genital sores, hematuria, impotence, penile discharge and urgency.   Musculoskeletal: Negative.  Negative for arthralgias, joint swelling and myalgias.   Skin: Negative.  Negative for rash.   Allergic/Immunologic: Negative.    Neurological: Negative.  Negative for dizziness, weakness, headaches and paresthesias.   Hematological: Negative.    Psychiatric/Behavioral: Positive for mood changes. The patient is nervous/anxious.             OBJECTIVE     /82   Pulse 66   Temp 98.4  F (36.9  C) (Oral)   Ht 1.778 m (5' 10\")   Wt 101.6 kg (224 lb)   SpO2 95%   BMI 32.14 kg/m         Estimated body mass index is 32.14 kg/m  as calculated from the following:    Height as of this encounter: 1.778 m (5' 10\").    Weight as of this encounter: 101.6 kg (224 lb).    Complete \"Weight Managment Plan\" in the progress note from the Adult Preventative or Medicare smartsets, use phrase " .WEIGHTPLAN, or choose an option from Weight Management Resources smartset below.        Labs:  Lab Results   Component Value Date    WBC 5.7 09/23/2020    HGB 15.8 09/23/2020    HCT 47.5 09/23/2020     09/23/2020    CHOL 223 (H) 09/23/2020    TRIG 292 (H) 09/23/2020    HDL 35 (L) 09/23/2020    ALT 24 09/23/2020    AST 10 09/23/2020     09/23/2020    BUN 9 09/23/2020    CO2 22 09/23/2020    TSH 1.23 09/23/2020       Physical Exam  Constitutional:       Appearance: Normal appearance. He is normal weight.   HENT:      Head: Normocephalic and atraumatic.      Right Ear: Tympanic membrane, ear canal and external ear normal.      Left Ear: Tympanic membrane, ear canal and external ear normal.      Nose: Nose normal.      Mouth/Throat:      Mouth: Mucous membranes are moist.      Pharynx: Oropharynx is clear.   Eyes:      Extraocular Movements: Extraocular movements intact.      Conjunctiva/sclera: Conjunctivae normal.      Pupils: Pupils are equal, round, and reactive to light.   Cardiovascular:      Rate and Rhythm: Normal rate and regular rhythm.      Pulses: Normal pulses.      Heart sounds: Normal heart sounds.   Pulmonary:      Effort: Pulmonary effort is normal.      Breath sounds: Normal breath sounds.   Abdominal:      General: Abdomen is flat. Bowel sounds are normal.      Palpations: Abdomen is soft.   Genitourinary:     Penis: Normal.       Testes: Normal.   Musculoskeletal:         General: Normal range of motion.      Cervical back: Normal range of motion and neck supple.   Skin:     General: Skin is warm and dry.      Capillary Refill: Capillary refill takes less than 2 seconds.   Neurological:      General: No focal deficit present.      Mental Status: He is alert and oriented to person, place, and time.   Psychiatric:         Mood and Affect: Mood normal.         Behavior: Behavior normal.         Thought Content: Thought content normal.         Judgment: Judgment normal.                ASSESSMENT/PLAN     (Z00.00) Routine history and physical examination of adult  (primary encounter diagnosis)    Plan: Lipid panel reflex to direct LDL Fasting, CBC         with platelets differential, Comprehensive         metabolic panel    (R79.89) Low vitamin D level    Plan: Vitamin D Level    (G47.33) Obstructive sleep apnea syndrome    Plan: Sleep Study Referral     -Discussed/Reinforced healthy diety, lifestyle, exercise and safety.  -Recommended completion of routine dental and eye exam.  -Lab screenings completed today. Results pending.     Weight management plan: Discussed healthy diet and exercise guidelines     Cholesterol Screening (if applicable): Complete Date of Completion: today Follow-up Recommendation: as needed  Diabetes Screening (if applicable): Complete Date of Completion: today Follow-up Recommendation: as needed  Depression Screening (if applicable): Complete Date of Completion: today Follow-up Recommendation: one year    Follow-Up:  Follow up in one year, or sooner if needed.     Patient engaged in their plan of care. Patient verbalized understanding and agreed with the final plan.  AVS printed and given to patient.    Celeste Roy NP   [facility]  [facility] [address] [address]  [phone]

## 2023-07-31 NOTE — NURSING NOTE
"ROOM:2  ALEXEY PADILLA    Preferred Name: Ralph     How did you hear about us?  Current Patient    38 year old  Chief Complaint   Patient presents with     Physical       Blood pressure 124/82, pulse 66, temperature 98.4  F (36.9  C), temperature source Oral, height 1.778 m (5' 10\"), weight 101.6 kg (224 lb), SpO2 95 %. Body mass index is 32.14 kg/m .  BP completed using cuff size:        Patient Active Problem List   Diagnosis     Right elbow tendonitis       Wt Readings from Last 2 Encounters:   07/31/23 101.6 kg (224 lb)   02/09/23 99.8 kg (220 lb)     BP Readings from Last 3 Encounters:   07/31/23 124/82   02/09/23 126/87   02/02/23 118/81       No Known Allergies    Current Outpatient Medications   Medication     escitalopram (LEXAPRO) 20 MG tablet     FINASTERIDE PO     cephALEXin (KEFLEX) 500 MG capsule     valACYclovir (VALTREX) 1000 mg tablet     No current facility-administered medications for this visit.       Social History     Tobacco Use     Smoking status: Never     Smokeless tobacco: Never   Substance Use Topics     Alcohol use: Not Currently     Comment: none     Drug use: Never       Honoring Choices - Health Care Directive Guide offered to patient at time of visit.    Health Maintenance Due   Topic Date Due     ADVANCE CARE PLANNING  Never done     HEPATITIS B IMMUNIZATION (1 of 3 - 3-dose series) Never done     HIV SCREENING  Never done     HEPATITIS C SCREENING  Never done     DTAP/TDAP/TD IMMUNIZATION (1 - Tdap) Never done     YEARLY PREVENTIVE VISIT  09/23/2021       Immunization History   Administered Date(s) Administered     COVID-19 Bivalent 12+ (Pfizer) 11/04/2022     COVID-19 MONOVALENT 12+ (Pfizer) 10/29/2021     COVID-19 Monovalent 12+ (Pfizer 2022) 07/30/2022     COVID-19 Vaccine (Sol) 04/11/2021     Influenza Vaccine >6 months (Alfuria,Fluzone) 09/15/2018, 09/23/2020, 09/15/2021       No results found for: PAP    Recent Labs   Lab Test 09/23/20  1306   *   HDL 35*   TRIG " 292*   ALT 24   CR 0.79   GFRESTIMATED >90   GFRESTBLACK >90   ALBUMIN 4.2   POTASSIUM 4.3   TSH 1.23           2/2/2023     9:55 AM 9/1/2022     8:50 AM   PHQ-2 ( 1999 Pfizer)   Q1: Little interest or pleasure in doing things 0 0   Q2: Feeling down, depressed or hopeless 0 0   PHQ-2 Score 0 0           9/23/2020    12:47 PM 10/11/2021     7:46 AM 11/2/2021    10:17 AM 7/31/2023     8:22 AM   PHQ-9 SCORE   PHQ-9 Total Score MyChart   8 (Mild depression) 5 (Mild depression)   PHQ-9 Total Score 6 18 8 5           10/11/2021     7:46 AM 11/2/2021    10:17 AM 7/31/2023     8:23 AM   CARROL-7 SCORE   Total Score  7 (mild anxiety) 5 (mild anxiety)   Total Score 19 7 5            No data to display                Jose Ramon Palacio    July 31, 2023 9:24 AM

## 2023-08-01 LAB — DEPRECATED CALCIDIOL+CALCIFEROL SERPL-MC: 25 UG/L (ref 20–75)

## 2023-08-03 ENCOUNTER — VIRTUAL VISIT (OUTPATIENT)
Dept: FAMILY MEDICINE | Facility: CLINIC | Age: 38
End: 2023-08-03
Payer: COMMERCIAL

## 2023-08-03 DIAGNOSIS — E78.2 MIXED HYPERLIPIDEMIA: Primary | ICD-10-CM

## 2023-08-03 RX ORDER — ATORVASTATIN CALCIUM 20 MG/1
20 TABLET, FILM COATED ORAL DAILY
Qty: 90 TABLET | Refills: 1 | Status: SHIPPED | OUTPATIENT
Start: 2023-08-03 | End: 2024-02-03

## 2023-08-03 NOTE — PROGRESS NOTES
Ralph is a 38 year old who is being evaluated via a billable video visit.      How would you like to obtain your AVS? MyChart  If the video visit is dropped, the invitation should be resent by: Text to cell phone: [phone]  Will anyone else be joining your video visit? No      Subjective   Ralph is a 38 year old, presenting for the following health issues:  Results    HPI     MARE is here to discuss hyperlipidemia.  He has a very strong family history, both his mother and father have hyperlipidemia.  He has gained some weight recently, but his lipids have been creeping up over the past 2 years for sure.  His diet is fairly general, he knows he can improve on that.  His partner is willing to do the work with him.    Review of Systems   CONSTITUTIONAL: NEGATIVE for fever, chills, change in weight  ENT/MOUTH: NEGATIVE for ear, mouth and throat problems  RESP: NEGATIVE for significant cough or SOB  CV: NEGATIVE for chest pain, palpitations or peripheral edema      Objective           Vitals:  No vitals were obtained today due to virtual visit.    Physical Exam   GENERAL: Healthy, alert and no distress  EYES: Eyes grossly normal to inspection.  No discharge or erythema, or obvious scleral/conjunctival abnormalities.  RESP: No audible wheeze, cough, or visible cyanosis.  No visible retractions or increased work of breathing.    SKIN: Visible skin clear. No significant rash, abnormal pigmentation or lesions.  NEURO: Cranial nerves grossly intact.  Mentation and speech appropriate for age.  PSYCH: Mentation appears normal, affect normal/bright, judgement and insight intact, normal speech and appearance well-groomed.    (E78.2) Mixed hyperlipidemia  (primary encounter diagnosis)  Comment: I did talk to MARE about the [facility], he is going to think about that.    Plan: atorvastatin (LIPITOR) 20 MG tablet   Return to clinic in 3-6 months for follow up.    We discussed use, side effects of medication  Return to clinic  prn    Video-Visit Details    Type of service:  Video Visit   Video Start Time: 0905  Video End Time:0915    Originating Location (pt. Location): Home    Distant Location (provider location):  On-site  Platform used for Video Visit: JesseWell

## 2023-08-10 DIAGNOSIS — B00.9 HSV-1 (HERPES SIMPLEX VIRUS 1) INFECTION: ICD-10-CM

## 2023-08-16 NOTE — TELEPHONE ENCOUNTER
valacyclovir 1 gram tablet        Route: Take 1 tablet (1,000 mg) by mouth 2 times daily - Oral  Last Written Prescription Date:  9-1-22  Last Fill Quantity: 10,   # refills: 4  Last Office Visit : 8-3-23  Future Office visit:  none    Routing refill request to provider for review/approval because:  Directions do not match dispensing amount

## 2023-08-18 RX ORDER — VALACYCLOVIR HYDROCHLORIDE 1 G/1
1000 TABLET, FILM COATED ORAL 2 TIMES DAILY
Qty: 10 TABLET | Refills: 2 | Status: SHIPPED | OUTPATIENT
Start: 2023-08-18

## 2023-09-11 ENCOUNTER — TELEPHONE (OUTPATIENT)
Dept: FAMILY MEDICINE | Facility: CLINIC | Age: 38
End: 2023-09-11

## 2023-09-11 DIAGNOSIS — F41.9 ANXIETY AND DEPRESSION: ICD-10-CM

## 2023-09-11 DIAGNOSIS — F32.A ANXIETY AND DEPRESSION: ICD-10-CM

## 2023-09-11 RX ORDER — ESCITALOPRAM OXALATE 20 MG/1
20 TABLET ORAL DAILY
Qty: 90 TABLET | Refills: 3 | Status: SHIPPED | OUTPATIENT
Start: 2023-09-11 | End: 2024-07-26

## 2023-09-11 RX ORDER — ESCITALOPRAM OXALATE 20 MG/1
20 TABLET ORAL DAILY
Qty: 90 TABLET | Refills: 3 | OUTPATIENT
Start: 2023-09-11

## 2023-09-11 NOTE — TELEPHONE ENCOUNTER
[facility] phone call message - patient requesting a refill:    Full Medication Name: escitalopram (LEXAPRO) 20 MG tablet    Dose: 20 mg      Pharmacy confirmed as   [facility] -- [place], [place] - [address]  [facility] [address]  Phone: [phone] Fax: [phone]  : Yes    Medication tab checked to see if medication has been sent  Yes    Is patient out of medication: Yes    Did patient contact the pharmacy? Yes    Additional Comments:      Primary language: English      needed? No    Call taken on September 11, 2023 at 9:46 AM by Jose Ramon Palacio    Route to P [facility] MED REFILLS TEAM     ++If medication is a controlled substance, please route directly to the provider++

## 2023-09-22 ENCOUNTER — PRE VISIT (OUTPATIENT)
Dept: UROLOGY | Facility: CLINIC | Age: 38
End: 2023-09-22
Payer: COMMERCIAL

## 2023-09-22 DIAGNOSIS — Z30.09 ENCOUNTER FOR OTHER GENERAL COUNSELING OR ADVICE ON CONTRACEPTION: Primary | ICD-10-CM

## 2023-09-22 NOTE — CONFIDENTIAL NOTE
Patient coming in for vasectomy procedure    Sent V[employer]ury message with prep which included:    Stop all blood thinners for seven days prior to procedure  Eat a meal prior to procedure  Shave the hair from the base of the penis and scrotum the night prior to procedure.      Mia Martinez  09/22/23  1:37 PM

## 2023-10-05 ENCOUNTER — OFFICE VISIT (OUTPATIENT)
Dept: UROLOGY | Facility: CLINIC | Age: 38
End: 2023-10-05
Payer: COMMERCIAL

## 2023-10-05 VITALS
BODY MASS INDEX: 30.78 KG/M2 | SYSTOLIC BLOOD PRESSURE: 129 MMHG | WEIGHT: 215 LBS | HEIGHT: 70 IN | DIASTOLIC BLOOD PRESSURE: 86 MMHG | HEART RATE: 78 BPM

## 2023-10-05 DIAGNOSIS — Z30.2 ENCOUNTER FOR VASECTOMY: Primary | ICD-10-CM

## 2023-10-05 PROCEDURE — 55250 REMOVAL OF SPERM DUCT(S): CPT | Performed by: UROLOGY

## 2023-10-05 ASSESSMENT — PAIN SCALES - GENERAL: PAINLEVEL: NO PAIN (0)

## 2023-10-05 NOTE — NURSING NOTE
"Chief Complaint   Patient presents with    Sterilization       Blood pressure 129/86, pulse 78, height 1.778 m (5' 10\"), weight 97.5 kg (215 lb). Body mass index is 30.85 kg/m .    Patient Active Problem List   Diagnosis    Right elbow tendonitis       No Known Allergies    Current Outpatient Medications   Medication Sig Dispense Refill    atorvastatin (LIPITOR) 20 MG tablet Take 1 tablet (20 mg) by mouth daily 90 tablet 1    cephALEXin (KEFLEX) 500 MG capsule Take 1 capsule (500 mg) by mouth 3 times daily (Patient not taking: Reported on 2023) 30 capsule 0    escitalopram (LEXAPRO) 20 MG tablet Take 1 tablet (20 mg) by mouth daily 90 tablet 3    FINASTERIDE PO Take 1.25 mg by mouth daily       valACYclovir (VALTREX) 1000 mg tablet TAKE 1 TABLET BY MOUTH TWICE DAILY 10 tablet 2       Social History     Tobacco Use    Smoking status: Never    Smokeless tobacco: Never   Substance Use Topics    Alcohol use: Not Currently     Comment: none    Drug use: Never       Invasive Procedure Safety Checklist:    Procedure: Bilateral Vasectomy    Action: Complete sections and checkboxes as appropriate.    Pre-procedure:  1. Patient ID Verified with 2 identifiers (Lupe and  or MRN) : YES    2. Procedure and site verified with patient/designee (when able) : YES    3. Accurate consent documentation in medical record : YES    4. H&P (or appropriate assessment) documented in medical record : N/A  H&P must be up to 30 days prior to procedure an updated within 24 hours of                 Procedure as applicable.     5. Relevant diagnostic and radiology test results appropriately labeled and displayed as applicable : YES    6. Blood products, implants, devices, and/or special equipment available for the procedure as applicable : YES    7. Procedure site(s) marked with provider initials [Exclusions: none] : NO    8. Marking not required. Reason : Yes  Procedure does not require site marking    Time Out:     Time-Out performed " immediately prior to starting procedure, including verbal and active participation of all team members addressing: YES    1. Correct patient identity.  2. Confirmed that the correct side and site are marked.  3. An accurate procedure to be done.  4. Agreement on the procedure to be done.  5. Correct patient position.  6. Relevant images and results are properly labeled and appropriately displayed.  7. The need to administer antibiotics or fluids for irrigation purposes during the procedure as applicable.  8. Safety precautions based on patient history or medication use.    During Procedure: Verification of correct person, site, and procedure occurs any time the responsibility for care of the patient is transferred to another member of the care team.    The following medication was given:     MEDICATION:  Lidocaine without epinephrine 2%  ROUTE: administered by physician - scrotal   SITE: administered by physician - scrotal   DOSE: 10 mL  LOT #: [id number]  : WatchGuard  EXPIRATION DATE: aug 2024  NDC#: 55150-254-10   Was there drug waste? Yes  Amount of drug waste (mL): 10 mL.  Reason for waste:  Single use vial    Prior to injection, verified patient identity using patient's name and date of birth.  Due to injection administration, patient instructed to remain in clinic for 15 minutes  afterwards, and to report any adverse reaction to me immediately.    Drug Amount Wasted:  Yes: 10 mL (20 mg/ml)  Vial/Syringe: Single dose vial      Amanda Linares  10/5/2023  2:03 PM

## 2023-10-05 NOTE — LETTER
10/5/2023       RE: Ralph Nieves  [address]  [facility] [address]     Dear Colleague,    Thank you for referring your patient, Ralph Nieves, to the [facility] [place] [facility]. Please see a copy of my visit note below.    Procedure: Vasectomy bilateral.   Anesthesia: Local lidocaine injection by surgeon.  Surgeon: CARMELITA Bustillo M.D.   Assistant:  none    Description of procedure: After the patient received education material regarding vasectomy, including risks and benefits, we proceeded with obtaining consent and proceeded with the surgery. He understands that there is a risk of late failure from recanalization. He understands that he is fertile until he has completed one or more semen analyses and he is given clearance from us for unprotected intercourse.  He understands that we will contact regarding the results but it is his responsibility to make sure he is cleared before having unprotected intercourse.  I have discussed with him other risks including bleeding, infection, acute and possible long-term pain.    The right vas was ligated first.  This was done using the standard technique by grasping it with a three-finger  and lifting it up to the skin.  A small amount of lidocaine was infiltrated into the skin and vasal sheath.  The skin was punctured and dilated bluntly using the scalpel-less dissector.  The sheath was identified and a rigid clamp was passed around the vas which was then lifted out of the incision.  The vas was cleaned off from the deferential vessels and the sheath, and cautery was used to divide the vas between mosquitos.  A small segment was removed and discarded.  The lumen of the vas was cannulated to confirm its identity, and the lumens of both ends were cauterized.  Pen cautery was used sparingly/as needed for minor bleeders.  A facial interposition was then performed with a single suture of 4-0  chromic. The skin defect was closed with a 4-0 chromic interrupted suture after confirming adequate hemostasis.       We then turned our attention to the left side and a similar technique was performed. This involved division, excision of a segment, cautery of the lumens, and fascial interposition.    There were no hematomas noted at the end of the procedure.  The patient tolerated the procedure well.    He was advised to return for a post vasectomy semen check in 2-3 months, and that he is not sterile and must continue to use contraception until we tell him otherwise (based on follow-up semen specimen(s)).    Plan:  -Post vasectomy semen analysis in 2-3 months   -ice packs to scrotum X 24h  -shower ok tomorrow  -OTC analgesics recommended     Amarjit MILLS

## 2023-10-06 NOTE — PROGRESS NOTES
Procedure: Vasectomy bilateral.   Anesthesia: Local lidocaine injection by surgeon.  Surgeon: CARMELITA Bustillo M.D.   Assistant:  none    Description of procedure: After the patient received education material regarding vasectomy, including risks and benefits, we proceeded with obtaining consent and proceeded with the surgery. He understands that there is a risk of late failure from recanalization. He understands that he is fertile until he has completed one or more semen analyses and he is given clearance from us for unprotected intercourse.  He understands that we will contact regarding the results but it is his responsibility to make sure he is cleared before having unprotected intercourse.  I have discussed with him other risks including bleeding, infection, acute and possible long-term pain.    The right vas was ligated first.  This was done using the standard technique by grasping it with a three-finger  and lifting it up to the skin.  A small amount of lidocaine was infiltrated into the skin and vasal sheath.  The skin was punctured and dilated bluntly using the scalpel-less dissector.  The sheath was identified and a rigid clamp was passed around the vas which was then lifted out of the incision.  The vas was cleaned off from the deferential vessels and the sheath, and cautery was used to divide the vas between mosquitos.  A small segment was removed and discarded.  The lumen of the vas was cannulated to confirm its identity, and the lumens of both ends were cauterized.  Pen cautery was used sparingly/as needed for minor bleeders.  A facial interposition was then performed with a single suture of 4-0 chromic. The skin defect was closed with a 4-0 chromic interrupted suture after confirming adequate hemostasis.       We then turned our attention to the left side and a similar technique was performed. This involved division, excision of a segment, cautery of the lumens, and fascial interposition.    There were no  hematomas noted at the end of the procedure.  The patient tolerated the procedure well.    He was advised to return for a post vasectomy semen check in 2-3 months, and that he is not sterile and must continue to use contraception until we tell him otherwise (based on follow-up semen specimen(s)).    Plan:  -Post vasectomy semen analysis in 2-3 months   -ice packs to scrotum X 24h  -shower ok tomorrow  -OTC analgesics recommended     Amarjit MILLS

## 2023-11-06 ENCOUNTER — VIRTUAL VISIT (OUTPATIENT)
Dept: SLEEP MEDICINE | Facility: CLINIC | Age: 38
End: 2023-11-06
Attending: NURSE PRACTITIONER
Payer: COMMERCIAL

## 2023-11-06 DIAGNOSIS — G47.33 OBSTRUCTIVE SLEEP APNEA SYNDROME: Primary | ICD-10-CM

## 2023-11-06 PROCEDURE — 99203 OFFICE O/P NEW LOW 30 MIN: CPT | Mod: VID | Performed by: PHYSICIAN ASSISTANT

## 2023-11-06 RX ORDER — ZOLPIDEM TARTRATE 5 MG/1
TABLET ORAL
Qty: 1 TABLET | Refills: 0 | Status: SHIPPED | OUTPATIENT
Start: 2023-11-06

## 2023-11-06 ASSESSMENT — SLEEP AND FATIGUE QUESTIONNAIRES
HOW LIKELY ARE YOU TO NOD OFF OR FALL ASLEEP WHILE WATCHING TV: MODERATE CHANCE OF DOZING
HOW LIKELY ARE YOU TO NOD OFF OR FALL ASLEEP WHILE SITTING QUIETLY AFTER LUNCH WITHOUT ALCOHOL: MODERATE CHANCE OF DOZING
HOW LIKELY ARE YOU TO NOD OFF OR FALL ASLEEP WHILE SITTING AND TALKING TO SOMEONE: WOULD NEVER DOZE
HOW LIKELY ARE YOU TO NOD OFF OR FALL ASLEEP IN A CAR, WHILE STOPPED FOR A FEW MINUTES IN TRAFFIC: WOULD NEVER DOZE
HOW LIKELY ARE YOU TO NOD OFF OR FALL ASLEEP WHILE LYING DOWN TO REST IN THE AFTERNOON WHEN CIRCUMSTANCES PERMIT: HIGH CHANCE OF DOZING
HOW LIKELY ARE YOU TO NOD OFF OR FALL ASLEEP WHILE SITTING INACTIVE IN A PUBLIC PLACE: WOULD NEVER DOZE
HOW LIKELY ARE YOU TO NOD OFF OR FALL ASLEEP WHEN YOU ARE A PASSENGER IN A CAR FOR AN HOUR WITHOUT A BREAK: WOULD NEVER DOZE
HOW LIKELY ARE YOU TO NOD OFF OR FALL ASLEEP WHILE SITTING AND READING: MODERATE CHANCE OF DOZING

## 2023-11-06 NOTE — PROGRESS NOTES
Does Ralph have a CPAP/Bipap?  No     [place] Sleep Scale:  9  Stop Ban  BMI:30.85    Ralph is a 38 year old who is being evaluated via a billable video visit.      How would you like to obtain your AVS? MyChart  If the video visit is dropped, the invitation should be resent by: Text to cell phone: [phone]  Will anyone else be joining your video visit? No              Subjective   Ralph is a 38 year old, presenting for the following health issues:  Sleep Problem (Obstructive sleep apnea syndrome)          Objective           Vitals:  No vitals were obtained today due to virtual visit.    Physical Exam   GENERAL: Healthy, alert and no distress  EYES: Eyes grossly normal to inspection.  No discharge or erythema, or obvious scleral/conjunctival abnormalities.  RESP: No audible wheeze, cough, or visible cyanosis.  No visible retractions or increased work of breathing.    SKIN: Visible skin clear. No significant rash, abnormal pigmentation or lesions.  NEURO: Cranial nerves grossly intact.  Mentation and speech appropriate for age.  PSYCH: Mentation appears normal, affect normal/bright, judgement and insight intact, normal speech and appearance well-groomed.                Video-Visit Details    Type of service:  Video Visit   Video Start Time: 10:54 AM  Video End Time:11:34 AM    Originating Location (pt. Location): Home    Distant Location (provider location):  On-site  Platform used for Video Visit: [facility]       Outpatient Sleep Medicine Consultation:      Name: Ralph Nieves MRN# [id number]   Age: 38 year old YOB: 1985     Date of Consultation: 2023  Consultation is requested by: Celeste Roy NP  [address] [address] Celeste Roy  Primary care provider: Celeste Roy       Reason for Sleep Consult:     Ralph Nieves is sent by Celeste Roy for a sleep consultation regarding snoring, poor quality sleep, witnessed apneas. .    Patient s Reason for  visit  Ralph Nieves main reason for visit: poor quality of sleep, snoring disrupting partner  Patient states problem(s) started: 2-3 years ago  Ralph Nieves's goals for this visit: Figure out how to get better sleep           Assessment and Plan:     Summary Sleep Diagnoses:  Suspected sleep apnea-snoring, witnessed apneas, frequent awakenings, snort arousals, stop bang 5.     Comorbid Diagnoses:  HLD      Summary Recommendations:  Lab study for suspected sleep apnea. Discussed sleep hygiene and avoiding too much time in bed, maintaining a good sleep routine.   No orders of the defined types were placed in this encounter.        Summary Counseling:    Sleep Testing Reviewed  Obstructive Sleep Apnea Reviewed  Complications of Untreated Sleep Apnea Reviewed      Medical Decision-making:   Educational materials provided in instructions    Total time spent reviewing medical records, history and physical examination, review of previous testing and interpretation as well as documentation on this date:30 min    CC: Celeste Roy          History of Present Illness:     Past Sleep Evaluations:    SLEEP-WAKE SCHEDULE:     Work/School Days: Patient goes to school/work: Yes   Usually gets into bed at 930  Takes patient about 45min to fall asleep  Has trouble falling asleep 3-4 nights per week  Wakes up in the middle of the night 2-3 times.  Wakes up due to    He has trouble falling back asleep 2-3 times a week.   It usually takes 30-45 to get back to sleep  Patient is usually up at 0800  Uses alarm: Yes    Weekends/Non-work Days/All Other Days:  Usually gets into bed at 930   Takes patient about 30-45 to fall asleep  Patient is usually up at 0800  Uses alarm: Yes    Sleep Need  Patient gets  6-7 sleep on average   Patient thinks he needs about 8 sleep    Ralph Nieves prefers to sleep in this position(s): Side   Patient states they do the following activities in bed: Work    Naps  Patient takes a purposeful nap 2-3 times a  week and naps are usually 30-45 in duration  He feels better after a nap: Yes  He dozes off unintentionally 4-5 days per week  Patient has had a driving accident or near-miss due to sleepiness/drowsiness: No      SLEEP DISRUPTIONS:    Breathing/Snoring  Patient snores:Yes  Other people complain about his snoring: Yes  Patient has been told he stops breathing in his sleep:Yes  He has issues with the following: Morning mouth dryness;Stuffy nose when you wake up    Movement:  Patient gets pain, discomfort, with an urge to move:  Yes  It happens when he is resting:  Yes  It happens more at night:  Yes  Patient has been told he kicks his legs at night:  No     Behaviors in Sleep:  Ralph Nieves has experienced the following behaviors while sleeping: Recurring Nightmares  He has experienced sudden muscle weakness during the day: No      Is there anything else you would like your sleep provider to know: my father has sleep apnea and uses a cpap        CAFFEINE AND OTHER SUBSTANCES:    Patient consumes caffeinated beverages per day:  2-3  Last caffeine use is usually: morning  List of any prescribed or over the counter stimulants that patient takes: n/a  List of any prescribed or over the counter sleep medication patient takes: n/a  List of previous sleep medications that patient has tried: n/a  Patient drinks alcohol to help them sleep: No  Patient drinks alcohol near bedtime: No    Family History:  Patient has a family member been diagnosed with a sleep disorder: Yes  father - sleep apnea         SCALES:    EPWORTH SLEEPINESS SCALE         11/6/2023    10:43 AM    [place] Sleepiness Scale ( GUCCI Wilde  4508-6597<br>ESS - [place]/English - Final version - 21 Nov 07 - [facility] [employer].)   Sitting and reading Moderate chance of dozing   Watching TV Moderate chance of dozing   Sitting, inactive in a public place (e.g. a theatre or a meeting) Would never doze   As a passenger in a car for an hour without a break Would never  doze   Lying down to rest in the afternoon when circumstances permit High chance of dozing   Sitting and talking to someone Would never doze   Sitting quietly after a lunch without alcohol Moderate chance of dozing   In a car, while stopped for a few minutes in traffic Would never doze   [place] Score (MC) 9   [place] Score (Sleep) 9         INSOMNIA SEVERITY INDEX (MALISSA)          11/6/2023    10:37 AM   Insomnia Severity Index (MALISSA)   Difficulty falling asleep 2   Difficulty staying asleep 2   Problems waking up too early 3   How SATISFIED/DISSATISFIED are you with your CURRENT sleep pattern? 3   How NOTICEABLE to others do you think your sleep problem is in terms of impairing the quality of your life? 3   How WORRIED/DISTRESSED are you about your current sleep problem? 4   To what extent do you consider your sleep problem to INTERFERE with your daily functioning (e.g. daytime fatigue, mood, ability to function at work/daily chores, concentration, memory, mood, etc.) CURRENTLY? 3   MALISSA Total Score 20       Guidelines for Scoring/Interpretation:  Total score categories:  0-7 = No clinically significant insomnia   8-14 = Subthreshold insomnia   15-21 = Clinical insomnia (moderate severity)  22-28 = Clinical insomnia (severe)  Used via courtesy of www.DropGiftsth.va.gov with permission from Bernardino Escobar PhD., [facility]      STOP BANG         11/6/2023    10:44 AM   STOP BANG Questionnaire (  2008, the American Society of Anesthesiologists, Inc. [employer]   1. Snoring - Do you snore loudly (louder than talking or loud enough to be heard through closed doors)? Yes   2. Tired - Do you often feel tired, fatigued, or sleepy during daytime? Yes   3. Observed - Has anyone observed you stop breathing during your sleep? Yes   4. Blood pressure - Do you have or are you being treated for high blood pressure? No   5. BMI - BMI more than 35 kg/m2? No   6. Age - Age over 50 yr old? No   7. Neck  "circumference - Neck circumference greater than 40 cm? Yes   8. Gender - Gender male? Yes   STOP BANG Score (MC): 4 (High risk of MALAIKA)         GAD7        7/31/2023     8:23 AM   CARROL-7    1. Feeling nervous, anxious, or on edge 1   2. Not being able to stop or control worrying 1   3. Worrying too much about different things 1   4. Trouble relaxing 1   5. Being so restless that it is hard to sit still 1   6. Becoming easily annoyed or irritable 0   7. Feeling afraid, as if something awful might happen 0   CARROL-7 Total Score 5   If you checked any problems, how difficult have they made it for you to do your work, take care of things at home, or get along with other people? Somewhat difficult         CAGE-AID         No data to display                  CAGE-AID reprinted with permission from the [place] Medical Journal, GERALD Ware. and KARLOS Crawford, \"Conjoint screening questionnaires for alcohol and drug abuse\" [place] Medical Journal 94: 135-140, 1995.      PATIENT HEALTH QUESTIONNAIRE-9 (PHQ - 9)        7/31/2023     8:22 AM   PHQ-9 ([employer])   1.  Little interest or pleasure in doing things 0   2.  Feeling down, depressed, or hopeless 0   3.  Trouble falling or staying asleep, or sleeping too much 1   4.  Feeling tired or having little energy 1   5.  Poor appetite or overeating 1   6.  Feeling bad about yourself - or that you are a failure or have let yourself or your family down 0   7.  Trouble concentrating on things, such as reading the newspaper or watching television 1   8.  Moving or speaking so slowly that other people could have noticed. Or the opposite - being so fidgety or restless that you have been moving around a lot more than usual 1   9.  Thoughts that you would be better off dead, or of hurting yourself in some way 0   PHQ-9 Total Score 5   6.  Feeling bad about yourself 0   7.  Trouble concentrating 1   8.  Moving slowly or restless 1   9.  Suicidal or self-harm thoughts 0   1.  Little interest or " pleasure in doing things Not at all   2.  Feeling down, depressed, or hopeless Not at all   3.  Trouble falling or staying asleep, or sleeping too much Several days   4.  Feeling tired or having little energy Several days   5.  Poor appetite or overeating Several days   6.  Feeling bad about yourself Not at all   7.  Trouble concentrating Several days   8.  Moving slowly or restless Several days   9.  Suicidal or self-harm thoughts Not at all   PHQ-9 via [facility] TOTAL SCORE-----> 5 (Mild depression)   Difficulty at work, home, or with people Not difficult at all       Developed by Vic Baires, Nasra Hermosillo, Bryce Braxton and colleagues, with an educational allison from [employer] No permission required to reproduce, translate, display or distribute.        Allergies:    No Known Allergies    Medications:    Current Outpatient Medications   Medication Sig Dispense Refill    atorvastatin (LIPITOR) 20 MG tablet Take 1 tablet (20 mg) by mouth daily 90 tablet 1    cephALEXin (KEFLEX) 500 MG capsule Take 1 capsule (500 mg) by mouth 3 times daily (Patient not taking: Reported on 7/31/2023) 30 capsule 0    escitalopram (LEXAPRO) 20 MG tablet Take 1 tablet (20 mg) by mouth daily 90 tablet 3    FINASTERIDE PO Take 1.25 mg by mouth daily       valACYclovir (VALTREX) 1000 mg tablet TAKE 1 TABLET BY MOUTH TWICE DAILY 10 tablet 2       Problem List:  Patient Active Problem List    Diagnosis Date Noted    Right elbow tendonitis 02/17/2023     Priority: Medium        Past Medical/Surgical History:  No past medical history on file.  Past Surgical History:   Procedure Laterality Date    APPENDECTOMY  2015    Ruptured in [place]    XR KNEE SURGERY HENRIQUE LEFT         Social History:  Social History     Socioeconomic History    Marital status: Single     Spouse name: Not on file    Number of children: Not on file    Years of education: Not on file    Highest education level: Not on file   Occupational History    Not on  file   Tobacco Use    Smoking status: Never    Smokeless tobacco: Never   Substance and Sexual Activity    Alcohol use: Not Currently     Comment: none    Drug use: Never    Sexual activity: Yes     Partners: Female     Birth control/protection: I.U.D.   Other Topics Concern    Not on file   Social History Narrative    Not on file     Social Determinants of Health     Financial Resource Strain: Not on file   Food Insecurity: Not on file   Transportation Needs: Not on file   Physical Activity: Not on file   Stress: Not on file   Social Connections: Not on file   Interpersonal Safety: Not on file   Housing Stability: Not on file       Family History:  Family History   Problem Relation Age of Onset    No Known Problems Mother     No Known Problems Father     Diabetes Maternal Grandfather        Review of Systems:  A complete review of systems reviewed by me is negative with the exeption of what has been mentioned in the history of present illness.  In the last TWO WEEKS have you experienced any of the following symptoms?  Fevers: No  Night Sweats: Yes  Weight Gain: Yes  Pain at Night: No  Double Vision: No  Changes in Vision: No  Difficulty Breathing through Nose: Yes  Sore Throat in Morning: Yes  Dry Mouth in the Morning: Yes  Shortness of Breath Lying Flat: No  Shortness of Breath With Activity: No  Awakening with Shortness of Breath: No  Increased Cough: Yes  Heart Racing at Night: Yes  Swelling in Feet or Legs: No  Diarrhea at Night: No  Heartburn at Night: No  Urinating More than Once at Night: No  Losing Control of Urine at Night: No  Joint Pains at Night: No  Headaches in Morning: No  Weakness in Arms or Legs: No  Depressed Mood: Yes  Anxiety: Yes     Physical Examination:  Vitals: There were no vitals taken for this visit.  BMI= There is no height or weight on file to calculate BMI.                Data: All pertinent previous laboratory data reviewed     Recent Labs   Lab Test 07/31/23  1011 09/23/20  1306   NA  "138 139   POTASSIUM 4.4 4.3   CHLORIDE 103 108   CO2 23 22   ANIONGAP 12 8   GLC 90 87   BUN 12.5 9   CR 0.88 0.79   DINESH 10.0 8.9       Recent Labs   Lab Test 07/31/23  1011   WBC 5.9   RBC 5.43   HGB 16.5   HCT 49.5   MCV 91   MCH 30.4   MCHC 33.3   RDW 12.9          Recent Labs   Lab Test 07/31/23  1011   PROTTOTAL 7.7   ALBUMIN 5.1   BILITOTAL 0.5   ALKPHOS 71   AST 23   ALT 26       TSH (mU/L)   Date Value   09/23/2020 1.23       No results found for: \"UAMP\", \"UBARB\", \"BENZODIAZEUR\", \"UCANN\", \"UCOC\", \"OPIT\", \"UPCP\"    No results found for: \"IRONSAT\", \"GS54302\", \"GENTRY\"    No results found for: \"PH\", \"PHARTERIAL\", \"PO2\", \"RZ2DJEJGEJR\", \"SAT\", \"PCO2\", \"HCO3\", \"BASEEXCESS\", \"SANJUANITA\", \"BEB\"    @LABRCNTIPR(phv:4,pco2v:4,po2v:4,hco3v:4,ronald:4,o2per:4)@    Echocardiology: No results found for this or any previous visit (from the past 4320 hour(s)).    Chest x-ray: No results found for this or any previous visit from the past 365 days.      Chest CT: No results found for this or any previous visit from the past 365 days.      PFT: Most Recent [facility]ze Pulmonary Function Testing    No results found for: \"20001\"  No results found for: \"20002\"  No results found for: \"20003\"  No results found for: \"20015\"  No results found for: \"20016\"  No results found for: \"20027\"  No results found for: \"20028\"  No results found for: \"20029\"  No results found for: \"89962\"  No results found for: \"20080\"  No results found for: \"20081\"  No results found for: \"20335\"  No results found for: \"20105\"  No results found for: \"20053\"  No results found for: \"20054\"  No results found for: \"20055\"      Cory Herring [facility] 11/6/2023         "

## 2023-11-06 NOTE — PATIENT INSTRUCTIONS
"          MY TREATMENT INFORMATION FOR SLEEP APNEA-  Ralph Nieves    DOCTOR : JONA Laguna-C    Am I having a sleep study at a sleep center?  --->Due to normal delays, you will be contacted within 2-4 weeks to schedule    Am I having a home sleep study?  --->Watch the video for the device you are using:    -/drop off device-   https://www.ItrybeforeIbuy.com/watch?v=yGGFBdELGhk    -Disposable device sent out require phone/computer application-   https://www.ItrybeforeIbuy.com/watch?v=BCce_vbiwxE      Frequently asked questions:  1. What is Obstructive Sleep Apnea (MALAIKA)? MALAIKA is the most common type of sleep apnea. Apnea means, \"without breath.\"  Apnea is most often caused by narrowing or collapse of the upper airway as muscles relax during sleep.   Almost everyone has occasional apneas. Most people with sleep apnea have had brief interruptions at night frequently for many years.  The severity of sleep apnea is related to how frequent and severe the events are.   2. What are the consequences of MALAIKA? Symptoms include: feeling sleepy during the day, snoring loudly, gasping or stopping of breathing, trouble sleeping, and occasionally morning headaches or heartburn at night.  Sleepiness can be serious and even increase the risk of falling asleep while driving. Other health consequences may include development of high blood pressure and other cardiovascular disease in persons who are susceptible. Untreated MALAIKA  can contribute to heart disease, stroke and diabetes.   3. What are the treatment options? In most situations, sleep apnea is a lifelong disease that must be managed with daily therapy. Medications are not effective for sleep apnea and surgery is generally not considered until other therapies have been tried. Your treatment is your choice . Continuous Positive Airway (CPAP) works right away and is the therapy that is effective in nearly everyone. An oral device to hold your jaw forward is usually the next most " reliable option. Other options include postioning devices (to keep you off your back), weight loss, and surgery including a tongue pacing device. There is more detail about some of these options below.  4. Are my sleep studies covered by insurance? Although we will request verification of coverage, we advise you also check in advance of the study to ensure there is coverage.    Important tips for those choosing CPAP and similar devices  For new devices, sign up for device DEBRA to monitor your device for your followup visits  We encourage you to utilize the [employer] debra or website (myAir web (resmed.com) ) to monitor your therapy progress and share the data with your healthcare team when you discuss your sleep apnea.                                                    Know your equipment:  CPAP is continuous positive airway pressure that prevents obstructive sleep apnea by keeping the throat from collapsing while you are sleeping. In most cases, the device is  smart  and can slowly self-adjusts if your throat collapses and keeps a record every day of how well you are treated-this information is available to you and your care team.  BPAP is bilevel positive airway pressure that keeps your throat open and also assists each breath with a pressure boost to maintain adequate breathing.  Special kinds of BPAP are used in patients who have inadequate breathing from lung or heart disease. In most cases, the device is  smart  and can slowly self-adjusts to assist breathing. Like CPAP, the device keeps a record of how well you are treated.  Your mask is your connection to the device. You get to choose what feels most comfortable and the staff will help to make sure if fits. Here: are some examples of the different masks that are available:       Key points to remember on your journey with sleep apnea:  Sleep study.  PAP devices often need to be adjusted during a sleep study to show that they are effective and adjusted  right.  Good tips to remember: Try wearing just the mask during a quiet time during the day so your body adapts to wearing it. A humidifier is recommended for comfort in most cases to prevent drying of your nose and throat. Allergy medication from your provider may help you if you are having nasal congestion.  Getting settled-in. It takes more than one night for most of us to get used to wearing a mask. Try wearing just the mask during a quiet time during the day so your body adapts to wearing it. A humidifier is recommended for comfort in most cases. Our team will work with you carefully on the first day and will be in contact within 4 days and again at 2 and 4 weeks for advice and remote device adjustments. Your therapy is evaluated by the device each day.   Use it every night. The more you are able to sleep naturally for 7-8 hours, the more likely you will have good sleep and to prevent health risks or symptoms from sleep apnea. Even if you use it 4 hours it helps. Occasionally all of us are unable to use a medical therapy, in sleep apnea, it is not dangerous to miss one night.   Communicate. Call our skilled team on the number provided on the first day if your visit for problems that make it difficult to wear the device. Over 2 out of 3 patients can learn to wear the device long-term with help from our team. Remember to call our team or your sleep providers if you are unable to wear the device as we may have other solutions for those who cannot adapt to mask CPAP therapy. It is recommended that you sleep your sleep provider within the first 3 months and yearly after that if you are not having problems.   Use it for your health. We encourage use of CPAP masks during daytime quiet periods to allow your face and brain to adapt to the sensation of CPAP so that it will be a more natural sensation to awaken to at night or during naps. This can be very useful during the first few weeks or months of adapting to CPAP  though it does not help medically to wear CPAP during wakefulness and  should not be used as a strategy just to meet guidelines.  Take care of your equipment. Make sure you clean your mask and tubing using directions every day and that your filter and mask are replaced as recommended or if they are not working.     BESIDES CPAP, WHAT OTHER THERAPIES ARE THERE?    Positioning Device  Positioning devices are generally used when sleep apnea is mild and only occurs on your back.This example shows a pillow that straps around the waist. It may be appropriate for those whose sleep study shows milder sleep apnea that occurs primarily when lying flat on one's back. Preliminary studies have shown benefit but effectiveness at home may need to be verified by a home sleep test. These devices are generally not covered by medical insurance.  Examples of devices that maintain sleeping on the back to prevent snoring and mild sleep apnea.    Belt type body positioner  http://[employer].com/    Electronic reminder  http://nightshifttherapy.[employer]/            Oral Appliance  What is oral appliance therapy?  An oral appliance device fits on your teeth at night like a retainer used after having braces. The device is made by a specialized dentist and requires several visits over 1-2 months before a manufactured device is made to fit your teeth and is adjusted to prevent your sleep apnea. Once an oral device is working properly, snoring should be improved. A home sleep test may be recommended at that time if to determine whether the sleep apnea is adequately treated.       Some things to remember:  -Oral devices are often, but not always, covered by your medical insurance. Be sure to check with your insurance provider.   -If you are referred for oral therapy, you will be given a list of specialized dentists to consider or you may choose to visit the Web site of the American Academy of Dental Sleep Medicine  -Oral devices are less likely to work  if you have severe sleep apnea or are extremely overweight.     More detailed information  An oral appliance is a small acrylic device that fits over the upper and lower teeth  (similar to a retainer or a mouth guard). This device slightly moves jaw forward, which moves the base of the tongue forward, opens the airway, improves breathing for effective treat snoring and obstructive sleep apnea in perhaps 7 out of 10 people .  The best working devices are custom-made by a dental device  after a mold is made of the teeth 1, 2, 3.  When is an oral appliance indicated?  Oral appliance therapy is recommended as a first-line treatment for patients with primary snoring, mild sleep apnea, and for patients with moderate sleep apnea who prefer appliance therapy to use of CPAP4, 5. Severity of sleep apnea is determined by sleep testing and is based on the number of respiratory events per hour of sleep.   How successful is oral appliance therapy?  The success rate of oral appliance therapy in patients with mild sleep apnea is 75-80% while in patients with moderate sleep apnea it is 50-70%. The chance of success in patients with severe sleep apnea is 40-50%. The research also shows that oral appliances have a beneficial effect on the cardiovascular health of MALAIKA patients at the same magnitude as CPAP therapy7.  Oral appliances should be a second-line treatment in cases of severe sleep apnea, but if not completely successful then a combination therapy utilizing CPAP plus oral appliance therapy may be effective. Oral appliances tend to be effective in a broad range of patients although studies show that the patients who have the highest success are females, younger patients, those with milder disease, and less severe obesity. 3, 6.   Finding a dentist that practices dental sleep medicine  Specific training is available through the American Academy of Dental Sleep Medicine for dentists interested in working in the field  of sleep. To find a dentist who is educated in the field of sleep and the use of oral appliances, near you, visit the Web site of the American Academy of Dental Sleep Medicine.    References  1. Berenice et al. Objectively measured vs self-reported compliance during oral appliance therapy for sleep-disordered breathing. Chest 2013; 144(5): 9689-0782.  2. Mita, et al. Objective measurement of compliance during oral appliance therapy for sleep-disordered breathing. Thorax 2013; 68(1): 91-96.  3. Darrion et al. Mandibular advancement devices in 620 men and women with MALAIKA and snoring: tolerability and predictors of treatment success. Chest 2004; 125: 5428-6793.  4. Kiley et al. Oral appliances for snoring and MALAIKA: a review. Sleep 2006; 29: 244-262.  5. Behzad et al. Oral appliance treatment for MALAIKA: an update. J Clin Sleep Med 2014; 10(2): 215-227.  6. Cesar et al. Predictors of OSAH treatment outcome. J Dent Res 2007; 86: 6045-9103.      Weight Loss:    Weight loss is a long-term strategy that may improve sleep apnea in some patients.    Weight management is a personal decision and the decision should be based on your interest and the potential benefits.  If you are interested in exploring weight loss strategies, the following discussion covers the impact on weight loss on sleep apnea and the approaches that may be successful.    Being overweight does not necessarily mean you will have health consequences.  Those who have BMI over 35 or over 27 with existing medical conditions carries greater risk.   Weight loss decreases severity of sleep apnea in most people with obesity. For those with mild obesity who have developed snoring with weight gain, even 15-30 pound weight loss can improve and occasionally eliminate sleep apnea.  Structured and life-long dietary and health habits are necessary to lose weight and keep healthier weight levels.     Though there may be significant health benefits from  weight loss, long-term weight loss is very difficult to achieve- studies show success with dietary management in less than 10% of people. In addition, substantial weight loss may require years of dietary control and may be difficult if patients have severe obesity. In these cases, surgical management may be considered.  Finally, older individuals who have tolerated obesity without health complications may be less likely to benefit from weight loss strategies.      [unfilled]    Surgery:    Surgery for obstructive sleep apnea is considered generally only when other therapies fail to work. Surgery may be discussed with you if you are having a difficult time tolerating CPAP and or when there is an abnormal structure that requires surgical correction.  Nose and throat surgeries often enlarge the airway to prevent collapse.  Most of these surgeries create pain for 1-2 weeks and up to half of the most common surgeries are not effective throughout life.  You should carefully discuss the benefits and drawbacks to surgery with your sleep provider and surgeon to determine if it is the best solution for you.   More information  Surgery for MALAIKA is directed at areas that are responsible for narrowing or complete obstruction of the airway during sleep.  There are a wide range of procedures available to enlarge and/or stabilize the airway to prevent blockage of breathing in the three major areas where it can occur: the palate, tongue, and nasal regions.  Successful surgical treatment depends on the accurate identification of the factors responsible for obstructive sleep apnea in each person.  A personalized approach is required because there is no single treatment that works well for everyone.  Because of anatomic variation, consultation with an examination by a sleep surgeon is a critical first step in determining what surgical options are best for each patient.  In some cases, examination during sedation may be recommended in  order to guide the selection of procedures.  Patients will be counseled about risks and benefits as well as the typical recovery course after surgery. Surgery is typically not a cure for a person s MALAIKA.  However, surgery will often significantly improve one s MALAIKA severity (termed  success rate ).  Even in the absence of a cure, surgery will decrease the cardiovascular risk associated with OSA7; improve overall quality of life8 (sleepiness, functionality, sleep quality, etc).      Palate Procedures:  Patients with MALAIKA often have narrowing of their airway in the region of their tonsils and uvula.  The goals of palate procedures are to widen the airway in this region as well as to help the tissues resist collapse.  Modern palate procedure techniques focus on tissue conservation and soft tissue rearrangement, rather than tissue removal.  Often the uvula is preserved in this procedure. Residual sleep apnea is common in patient after pharyngoplasty with an average reduction in sleep apnea events of 33%2.      Tongue Procedures:  ExamWhile patients are awake, the muscles that surround the throat are active and keep this region open for breathing. These muscles relax during sleep, allowing the tongue and other structures to collapse and block breathing.  There are several different tongue procedures available.  Selection of a tongue base procedure depends on characteristics seen on physical exam.  Generally, procedures are aimed at removing bulky tissues in this area or preventing the back of the tongue from falling back during sleep.  Success rates for tongue surgery range from 50-62%3.    Hypoglossal Nerve Stimulation:  Hypoglossal nerve stimulation has recently received approval from the [place] Food and Drug Administration for the treatment of obstructive sleep apnea.  This is based on research showing that the system was safe and effective in treating sleep apnea6.  Results showed that the median AHI score  decreased 68%, from 29.3 to 9.0. This therapy uses an implant system that senses breathing patterns and delivers mild stimulation to airway muscles, which keeps the airway open during sleep.  The system consists of three fully implanted components: a small generator (similar in size to a pacemaker), a breathing sensor, and a stimulation lead.  Using a small handheld remote, a patient turns the therapy on before bed and off upon awakening.    Candidates for this device must be greater than 18 years of age, have moderate to severe MALAIKA (AHI between 15-65), BMI less than 35, have tried CPAP/oral appliance for at least 8 weeks without success, and have appropriate upper airway anatomy (determined by a sleep endoscopy performed by Dr. Jeremy Crews).    Hypoglossal Nerve Stimulation Pathway:    The sleep surgeon s office will work with the patient through the insurance prior-authorization process (including communications and appeals).    Nasal Procedures:  Nasal obstruction can interfere with nasal breathing during the day and night.  Studies have shown that relief of nasal obstruction can improve the ability of some patients to tolerate positive airway pressure therapy for obstructive sleep apnea1.  Treatment options include medications such as nasal saline, topical corticosteroid and antihistamine sprays, and oral medications such as antihistamines or decongestants. Non-surgical treatments can include external nasal dilators for selected patients. If these are not successful by themselves, surgery can improve the nasal airway either alone or in combination with these other options.      Combination Procedures:  Combination of surgical procedures and other treatments may be recommended, particularly if patients have more than one area of narrowing or persistent positional disease.  The success rate of combination surgery ranges from 66-80%2,3.    References  Marcelina SCHMIDT. The Role of the Nose in Snoring and Obstructive  Sleep Apnoea: An Update.  Eur Arch Otorhinolaryngol. 2011; 268: 1365-73.   Ari SM; Luis JA; Forrest JR; Pallanch JF; Andrew MB; Maryuri SG; Pablo HERMAN. Surgical modifications of the upper airway for obstructive sleep apnea in adults: a systematic review and meta-analysis. SLEEP 2010;33(10):4334-8968. José Miguel GALEANA. Hypopharyngeal surgery in obstructive sleep apnea: an evidence-based medicine review.  Arch Otolaryngol Head Neck Surg. 2006 Feb;132(2):206-13.  Porfirio YH1, Екатерина Y, Dmitriy DELICIA. The efficacy of anatomically based multilevel surgery for obstructive sleep apnea. Otolaryngol Head Neck Surg. 2003 Oct;129(4):327-35.  Kezirian E, Goldberg A. Hypopharyngeal Surgery in Obstructive Sleep Apnea: An Evidence-Based Medicine Review. Arch Otolaryngol Head Neck Surg. 2006 Feb;132(2):206-13.  Gage CANSECO et al. Upper-Airway Stimulation for Obstructive Sleep Apnea.  N Engl J Med. 2014 Jan 9;370(2):139-49.  Peker Y et al. Increased Incidence of Cardiovascular Disease in Middle-aged Men with Obstructive Sleep Apnea. Am J Respir Crit Care Med; 2002 166: 159-165  Chapin EM et al. Studying Life Effects and Effectiveness of Palatopharyngoplasty (SLEEP) study: Subjective Outcomes of Isolated Uvulopalatopharyngoplasty. Otolaryngol Head Neck Surg. 2011; 144: 623-631.        WHAT IF I ONLY HAVE SNORING?    Mandibular advancement devices, lateral sleep positioning, long-term weight loss and treatment of nasal allergies have been shown to improve snoring.  Exercising tongue muscles with a game (https://apps.[employer].FatRedCouch/us/debra/soundly-reduce-snoring/bq3492053704) or stimulating the tongue during the day with a device (https://doi.org/10.3390/hud87181321) have improved snoring in some individuals.    Remember to Drive Safe... Drive Alive     Sleep health profoundly affects your health, mood, and your safety.  Thirty three percent of the population (one in three of us) is not getting enough sleep and many have a sleep disorder. Not getting  enough sleep or having an untreated / undertreated sleep condition may make us sleepy without even knowing it. In fact, our driving could be dramatically impaired due to our sleep health. As your provider, here are some things I would like you to know about driving:     Here are some warning signs for impairment and dangerous drowsy driving:              -Having been awake more than 16 hours               -Looking tired               -Eyelid drooping              -Head nodding (it could be too late at this point)              -Driving for more than 30 minutes     Some things you could do to make the driving safer if you are experiencing some drowsiness:              -Stop driving and rest              -Call for transportation              -Make sure your sleep disorder is adequately treated     Some things that have been shown NOT to work when experiencing drowsiness while driving:              -Turning on the radio              -Opening windows              -Eating any  distracting  /  entertaining  foods (e.g., sunflower seeds, candy, or any other)              -Talking on the phone      Your decision may not only impact your life, but also the life of others. Please, remember to drive safe for yourself and all of us.          [facility]  Cognitive Behavioral Therapy for Insomnia       Core Sleep Training Module    Now that you understand a bit more about how sleep works and what causes insomnia, you are ready to begin CBT-I Core Sleep Training.  This process involves five key strategies that will:    Strengthen your sleep drive and circadian sleep rhythm  Strengthen the link between your bed and sleep    It will be important that you continue to keep track of your sleep using your Insomnia  Leonard or your paper sleep diary.      Core Sleep Strategies    Much like physical activity and healthy eating strengthens our body, studies show that the following Core strategies are key to achieving stronger,  healthier sleep. The focus is on quality of sleep (not quantity) and improving how you feel during the day.     Reduce Your Time in Bed    Spending extra time in bed trying to get more sleep can cause more sleep disruption and weaken sleep efficiency. Sleep efficiency is simply the percentage of time a person spends asleep while in bed. Normal sleep efficiency is thought to be 85% or greater.  By reducing your time in bed, you will be awake longer during the day leading to quicker and deeper sleep at night. This strategy does not reduce the amount of sleep you get, just the time you are awake in bed.                                             Your provider has recommended the following initial sleep schedule determined by your  estimate of average sleep time over the past two weeks plus 30 minutes.  It also consider the best time for you to sleep.     Your Sleep Schedule Prescription                       Total Time in Bed:  8 hours                     Bedtime:  No earlier than 11                      Wake-up Time:  Out of bed every day by 7      Don't go to bed until you feel sleepy (not tired or fatigued)     This helps increase your sleep drive by keeping you awake longer.  If you go to bed when you're not sleepy, it gives your brain the wrong message and can lead to frustration.     If you feel sleepy before your prescribed bedtime, find activities that can help you stay awake until it is time for you to go to bed. Even brief naps in the evening can be very disruptive of sleep at night.      Don't stay in bed unless you are sleeping      If you are unable to fall asleep or return to sleep after about 30 minutes, get out of bed. This helps train your brain that the bed is for sleep. It is harmful to your sleep when you are worried or frustrated in bed. Do not read, eat, worry, think about sleep, use mobile phones or tablets, or watch TV in bed. Do not watch the clock during the night.     Go to another room and  do something relaxing. Plan things you can do when you get out of bed. Avoid use of mobile devices or computers when out of bed.    Return to bed only when you feel sleepy again.  Repeat as often as needed throughout the night.      Get out of bed at the same time every day    Getting up at the same time helps set your biological clock. It is important to stick to your wake time no matter how much sleep you got the night before or how you feel in the morning.    Varying your wake can have the same effect as jet lag.  It disrupts your biological clock and makes you feel more tired and exhausted.  Trying to  catch up  on sleep on the weekends only makes things worse and sets you up for a cycle of poor sleep the following weekdays.      Make sure you set an alarm and keep it away from the bed to prevent looking at the clock during the night.       Avoid daytime napping    Avoid daytime napping if possible. Napping partially fulfills your need for sleep and weakens your sleep drive at night.    However, if you find yourself sleepy (not just tired) you can take a brief 15-30-minute nap during the day if needed.  Naps within 7-8 hours of your prescribed wake time are less likely to affect your sleep the coming night.  Sleepy people make more mistakes and may hurt themselves or others. Therefore, safety trumps all other sleep prescriptions.  Never drive or operate equipment if drowsy or sleepy.     Changing Your Sleep Schedule Prescription    After a week, you can adjust your sleep schedule prescription based on how well you are sleeping. Use the following guidelines to change your prescribed time in bed based on information from your Insomnia  debra or paper sleep diary.          Increase Time in Bed by 15 Minutes IF:    Your Insomnia  debra progress tracker estimates that your sleep efficiency has been greater than 90% over the past week (press Progress icon on the home screen and swipe left for this value).    OR  you are falling asleep in less than 30 minutes AND awake less than 30 minutes during the night.              Decrease Time in Bed by 15 Minutes IF:    Your Insomnia  debra sleep diary progress tracker estimates that your sleep efficiency has been less than 80% over the past week (press Progress icon on the home screen and swipe left for this value).    OR it is taking longer than 30 minutes to fall asleep OR you are awake more than 30 minutes during the night.      DO NOT decrease your sleep schedule below 5.5  hours     Change is Challenging    Research shows that making significant changes in sleep habits improves sleep quality and how you feel. Improvement takes time and is not always steady. Change is challenging and can be stressful.  This is especially true if old habits - like spending more time in bed -- were a way to avoid worry about getting enough sleep.

## 2023-11-08 ENCOUNTER — OFFICE VISIT (OUTPATIENT)
Dept: FAMILY MEDICINE | Facility: CLINIC | Age: 38
End: 2023-11-08
Payer: COMMERCIAL

## 2023-11-08 VITALS
BODY MASS INDEX: 31.58 KG/M2 | WEIGHT: 225.6 LBS | TEMPERATURE: 98.6 F | DIASTOLIC BLOOD PRESSURE: 81 MMHG | OXYGEN SATURATION: 95 % | HEART RATE: 79 BPM | HEIGHT: 71 IN | SYSTOLIC BLOOD PRESSURE: 127 MMHG | RESPIRATION RATE: 18 BRPM

## 2023-11-08 DIAGNOSIS — J34.89 NASAL LESION: ICD-10-CM

## 2023-11-08 DIAGNOSIS — E78.5 HYPERLIPIDEMIA LDL GOAL <100: Primary | ICD-10-CM

## 2023-11-08 LAB
CHOLEST SERPL-MCNC: 170 MG/DL
HDLC SERPL-MCNC: 32 MG/DL
LDLC SERPL CALC-MCNC: 108 MG/DL
NONHDLC SERPL-MCNC: 138 MG/DL
TRIGL SERPL-MCNC: 148 MG/DL

## 2023-11-08 PROCEDURE — 87186 SC STD MICRODIL/AGAR DIL: CPT | Mod: ORL | Performed by: NURSE PRACTITIONER

## 2023-11-08 PROCEDURE — 80061 LIPID PANEL: CPT | Mod: ORL | Performed by: NURSE PRACTITIONER

## 2023-11-08 PROCEDURE — 87070 CULTURE OTHR SPECIMN AEROBIC: CPT | Mod: ORL | Performed by: NURSE PRACTITIONER

## 2023-11-08 RX ORDER — CEPHALEXIN 500 MG/1
500 CAPSULE ORAL 3 TIMES DAILY
Qty: 30 CAPSULE | Refills: 0 | Status: SHIPPED | OUTPATIENT
Start: 2023-11-08 | End: 2023-11-08

## 2023-11-08 ASSESSMENT — PAIN SCALES - GENERAL: PAINLEVEL: NO PAIN (0)

## 2023-11-08 NOTE — PROGRESS NOTES
Ralph Nieves is a 38 year old male who presents today to follow up on initiation of atorvastatin.  He has taken it as directed and has had no side effects.  He feels his diet could be better, but he is working on it.  He is very sedentary.  No chest pain, no shortness of breath, no activity intolerance.    MARE has noticed that the lesion in his right nares is re-occurring.  We treated this last winter with cephalexin, it resolved and did not re occur throughout the summer.  He felt the area and then scratched at it, it developed a scab and now is healing.      Review Of Systems   ROS: 10 point ROS neg other than the symptoms noted above in the HPI.    History reviewed. No pertinent past medical history.  Past Surgical History:   Procedure Laterality Date     APPENDECTOMY  2015    Ruptured in [place]     XR KNEE SURGERY HENRIQUE LEFT       Social History     Socioeconomic History     Marital status: Single     Spouse name: Not on file     Number of children: Not on file     Years of education: Not on file     Highest education level: Not on file   Occupational History     Not on file   Tobacco Use     Smoking status: Never     Smokeless tobacco: Never   Substance and Sexual Activity     Alcohol use: Not Currently     Comment: none     Drug use: Never     Sexual activity: Yes     Partners: Female     Birth control/protection: I.U.D.   Other Topics Concern     Not on file   Social History Narrative     Not on file     Social Determinants of Health     Financial Resource Strain: Low Risk  (11/8/2023)    Financial Resource Strain      Within the past 12 months, have you or your family members you live with been unable to get utilities (heat, electricity) when it was really needed?: No   Food Insecurity: Low Risk  (11/8/2023)    Food Insecurity      Within the past 12 months, did you worry that your food would run out before you got money to buy more?: No      Within the past 12 months, did the food you bought just not last and  "you didn t have money to get more?: No   Transportation Needs: Low Risk  (11/8/2023)    Transportation Needs      Within the past 12 months, has lack of transportation kept you from medical appointments, getting your medicines, non-medical meetings or appointments, work, or from getting things that you need?: No   Physical Activity: Not on file   Stress: Not on file   Social Connections: Not on file   Interpersonal Safety: Not on file   Housing Stability: Low Risk  (11/8/2023)    Housing Stability      Do you have housing? : Yes      Are you worried about losing your housing?: No     Family History   Problem Relation Age of Onset     No Known Problems Mother      No Known Problems Father      Diabetes Maternal Grandfather        /81 (BP Location: Left arm, Patient Position: Sitting, Cuff Size: Adult Regular)   Pulse 79   Temp 98.6  F (37  C) (Oral)   Resp 18   Ht 1.798 m (5' 10.8\")   Wt 102.3 kg (225 lb 9.6 oz)   SpO2 95%   BMI 31.64 kg/m      Exam:  Constitutional: healthy, alert and no distress  ENT: at the very tip of the right nares, and a slight bit on the exterior nose, there is redness and a bit of crust.  Cultured for bacteria.    Psychiatric: mentation appears normal and affect normal/bright    Assessment/Plan:  1. Hyperlipidemia LDL goal <100    - Lipid panel reflex to direct LDL Fasting; Future    2. Nasal lesion    - Skin Aerobic Bacterial Culture Routine; Future    We will follow up in one week when culture report back.        Options for treatment and follow-up care were reviewed with the patient. Patient engaged in the decision making process and verbalized understanding of the options discussed and agreed with the final plan.    "

## 2023-11-08 NOTE — NURSING NOTE
"ROOM:1  ALEXEY PADILLA    Preferred Name: Ralph     How did you hear about us?  Current Patient    38 year old  Chief Complaint   Patient presents with     Recheck Medication     Cholesterol medication       Blood pressure 127/81, pulse 79, temperature 98.6  F (37  C), temperature source Oral, resp. rate 18, height 1.798 m (5' 10.8\"), weight 102.3 kg (225 lb 9.6 oz), SpO2 95%. Body mass index is 31.64 kg/m .  BP completed using cuff size:        Patient Active Problem List   Diagnosis     Right elbow tendonitis       Wt Readings from Last 2 Encounters:   11/08/23 102.3 kg (225 lb 9.6 oz)   10/05/23 97.5 kg (215 lb)     BP Readings from Last 3 Encounters:   11/08/23 127/81   10/05/23 129/86   07/31/23 124/82       No Known Allergies    Current Outpatient Medications   Medication     atorvastatin (LIPITOR) 20 MG tablet     cephALEXin (KEFLEX) 500 MG capsule     escitalopram (LEXAPRO) 20 MG tablet     FINASTERIDE PO     valACYclovir (VALTREX) 1000 mg tablet     zolpidem (AMBIEN) 5 MG tablet     Current Facility-Administered Medications   Medication     lidocaine (PF) (XYLOCAINE) injection 10 mL       Social History     Tobacco Use     Smoking status: Never     Smokeless tobacco: Never   Substance Use Topics     Alcohol use: Not Currently     Comment: none     Drug use: Never       Honoring Choices - Health Care Directive Guide offered to patient at time of visit.    Health Maintenance Due   Topic Date Due     ADVANCE CARE PLANNING  Never done     HEPATITIS B IMMUNIZATION (1 of 3 - 3-dose series) Never done     HIV SCREENING  Never done     HEPATITIS C SCREENING  Never done     DTAP/TDAP/TD IMMUNIZATION (1 - Tdap) Never done       Immunization History   Administered Date(s) Administered     COVID-19 12+ (2023-24) (MODERNA) 10/30/2023     COVID-19 Bivalent 12+ (Pfizer) 11/04/2022     COVID-19 MONOVALENT 12+ (Pfizer) 10/29/2021     COVID-19 Monovalent 12+ (Pfizer 2022) 07/30/2022     COVID-19 Vaccine (Sol) 04/11/2021 " "    Influenza Vaccine >6 months (Alfuria,Fluzone) 09/15/2018, 09/23/2020, 09/15/2021       No results found for: \"PAP\"    Recent Labs   Lab Test 07/31/23  1011 09/23/20  1306   * 129*   HDL 34* 35*   TRIG 259* 292*   ALT 26 24   CR 0.88 0.79   GFRESTIMATED >90 >90   GFRESTBLACK  --  >90   ALBUMIN 5.1 4.2   POTASSIUM 4.4 4.3   TSH  --  1.23           11/8/2023    10:44 AM 8/3/2023     8:55 AM   PHQ-2 ( 1999 Pfizer)   Q1: Little interest or pleasure in doing things 1 0   Q2: Feeling down, depressed or hopeless 0 0   PHQ-2 Score 1 0           9/23/2020    12:47 PM 10/11/2021     7:46 AM 11/2/2021    10:17 AM 7/31/2023     8:22 AM   PHQ-9 SCORE   PHQ-9 Total Score MyChart   8 (Mild depression) 5 (Mild depression)   PHQ-9 Total Score 6 18 8 5           10/11/2021     7:46 AM 11/2/2021    10:17 AM 7/31/2023     8:23 AM   CARROL-7 SCORE   Total Score  7 (mild anxiety) 5 (mild anxiety)   Total Score 19 7 5            No data to display                Amanda Bourgeois, EMT    November 8, 2023 10:48 AM    "

## 2023-11-09 DIAGNOSIS — L08.9 LOCAL INFECTION OF SKIN AND SUBCUTANEOUS TISSUE: Primary | ICD-10-CM

## 2023-11-09 RX ORDER — CEPHALEXIN 500 MG/1
500 CAPSULE ORAL 3 TIMES DAILY
Qty: 30 CAPSULE | Refills: 4 | Status: SHIPPED | OUTPATIENT
Start: 2023-11-09

## 2023-11-15 LAB
BACTERIA WND CULT: ABNORMAL

## 2023-12-14 ENCOUNTER — MYC REFILL (OUTPATIENT)
Dept: FAMILY MEDICINE | Facility: CLINIC | Age: 38
End: 2023-12-14

## 2023-12-14 DIAGNOSIS — L65.9 HAIR LOSS: Primary | ICD-10-CM

## 2023-12-14 NOTE — TELEPHONE ENCOUNTER
FINASTERIDE PO   Last Written Prescription Date:  ?  Last Fill Quantity: /,   # refills: /  Last Office Visit : 11/8/2023  Future Office visit:  None    Routing refill request to provider for review/approval because:  Medication is reported/historical    Cathy Cuellar RN  Central Triage Red Flags/Med Refills

## 2023-12-20 RX ORDER — FINASTERIDE 1 MG/1
1 TABLET, FILM COATED ORAL DAILY
Qty: 30 TABLET | Refills: 3 | Status: SHIPPED | OUTPATIENT
Start: 2023-12-20

## 2024-01-22 NOTE — PROGRESS NOTES
Ralph is a 38 year old who is being evaluated via a billable video visit.      How would you like to obtain your AVS? MyChart  If the video visit is dropped, the invitation should be resent by: Text to cell phone: [phone]  Will anyone else be joining your video visit? No    Subjective   Ralph is a 38 year old, presenting for the following health issues:    MARE is here to discuss a prolonged cough that he has had since January 1, 2024.  He was visiting family and the children there had colds, which he caught.  He had fever, headache, stuffiness and a cough.  Most of the cold symptoms have gone away, the cough has lingered.  It is a productive cough that is worse in the morning and at night.  He has shortness of breath after he has coughed for a long period of time, but not with activity or at rest.    He no longer has a fever, just the cough.        Objective       Vitals:  No vitals were obtained today due to virtual visit.    Physical Exam   GENERAL: alert and no distress  EYES: Eyes grossly normal to inspection.  No discharge or erythema, or obvious scleral/conjunctival abnormalities.  RESP: No audible wheeze, cough, or visible cyanosis.    SKIN: Visible skin clear. No significant rash, abnormal pigmentation or lesions.  NEURO: Cranial nerves grossly intact.  Mentation and speech appropriate for age.  PSYCH: Appropriate affect, tone, and pace of words    (R05.1) Acute cough  (primary encounter diagnosis)  Plan: benzonatate (TESSALON) 200 MG capsule  MARE will let me know if this is not working for him.        Video-Visit Details    Type of service:  Video Visit   Video Start Time: 0900  Video End Time:9:20 AM    Originating Location (pt. Location): Home    Distant Location (provider location):  On-site  Platform used for Video Visit: Rita  Signed Electronically by: Celeste Roy NP

## 2024-01-24 ENCOUNTER — VIRTUAL VISIT (OUTPATIENT)
Dept: FAMILY MEDICINE | Facility: CLINIC | Age: 39
End: 2024-01-24
Payer: COMMERCIAL

## 2024-01-24 DIAGNOSIS — R05.1 ACUTE COUGH: Primary | ICD-10-CM

## 2024-01-24 RX ORDER — BENZONATATE 200 MG/1
200 CAPSULE ORAL 3 TIMES DAILY PRN
Qty: 21 CAPSULE | Refills: 0 | Status: SHIPPED | OUTPATIENT
Start: 2024-01-24

## 2024-01-31 DIAGNOSIS — E78.2 MIXED HYPERLIPIDEMIA: ICD-10-CM

## 2024-02-03 RX ORDER — ATORVASTATIN CALCIUM 20 MG/1
20 TABLET, FILM COATED ORAL DAILY
Qty: 90 TABLET | Refills: 1 | Status: SHIPPED | OUTPATIENT
Start: 2024-02-03

## 2024-02-04 NOTE — TELEPHONE ENCOUNTER
atorvastatin (LIPITOR) 20 MG tablet 90 tablet 1 8/3/2023  Last Office Visit : 1/24/2024  [facility]     Celeste Roy NP     Future Office visit:  0      Lab Results   Component Value Date    ALT 26 07/31/2023    ALT 24 09/23/2020     Recent Labs   Lab Test 11/08/23  1107 07/31/23  1011   CHOL 170 302*   HDL 32* 34*   * 216*   TRIG 148 259*

## 2024-07-26 DIAGNOSIS — F41.9 ANXIETY AND DEPRESSION: ICD-10-CM

## 2024-07-26 DIAGNOSIS — F32.A ANXIETY AND DEPRESSION: ICD-10-CM

## 2024-07-29 RX ORDER — ESCITALOPRAM OXALATE 20 MG/1
20 TABLET ORAL DAILY
Qty: 90 TABLET | Refills: 0 | Status: SHIPPED | OUTPATIENT
Start: 2024-07-29

## 2024-07-29 NOTE — TELEPHONE ENCOUNTER
"escitalopram (LEXAPRO) 20 MG tablet       Last Written Prescription Date:  9/11/23  Last Fill Quantity: 90,   # refills: 3  Last Office Visit : 1/24/24  Future Office visit:  None    Routing refill request to provider for review/approval because:  SSRIs Protocol [id number]/26/2024 08:06 AM   Protocol Details PHQ-9 score less than 5 in past 6 months    CARROL-7 score of less than 5 in past 6 months.   PHQ9  \"5\" 11/2/23    GAD7  \"5\"  11/2/23    Ashley JONES RN  P Central Nursing/Red Flag Triage & Med Refill Team  "

## 2024-08-20 ENCOUNTER — TELEPHONE (OUTPATIENT)
Dept: SLEEP MEDICINE | Facility: CLINIC | Age: 39
End: 2024-08-20
Payer: COMMERCIAL

## 2024-08-20 NOTE — TELEPHONE ENCOUNTER
LVM letting pt know that we will need to cancel his sleep study scheduled for 9/6/2024 as there is no tech available.  I asked that he call back to reschedule.    SAMANTHA Angeles, Clinical Specialist - [facility]

## 2024-09-22 ENCOUNTER — HEALTH MAINTENANCE LETTER (OUTPATIENT)
Age: 39
End: 2024-09-22

## 2024-10-30 ENCOUNTER — OFFICE VISIT (OUTPATIENT)
Dept: FAMILY MEDICINE | Facility: CLINIC | Age: 39
End: 2024-10-30
Payer: COMMERCIAL

## 2024-10-30 VITALS
BODY MASS INDEX: 32.01 KG/M2 | SYSTOLIC BLOOD PRESSURE: 142 MMHG | RESPIRATION RATE: 18 BRPM | HEART RATE: 92 BPM | WEIGHT: 223.6 LBS | TEMPERATURE: 98.4 F | DIASTOLIC BLOOD PRESSURE: 89 MMHG | OXYGEN SATURATION: 96 % | HEIGHT: 70 IN

## 2024-10-30 DIAGNOSIS — R79.89 LOW VITAMIN D LEVEL: ICD-10-CM

## 2024-10-30 DIAGNOSIS — Z79.899 MEDICATION MANAGEMENT: ICD-10-CM

## 2024-10-30 DIAGNOSIS — F41.9 ANXIETY AND DEPRESSION: ICD-10-CM

## 2024-10-30 DIAGNOSIS — E78.5 HYPERLIPIDEMIA LDL GOAL <100: Primary | ICD-10-CM

## 2024-10-30 DIAGNOSIS — L65.9 HAIR LOSS: ICD-10-CM

## 2024-10-30 DIAGNOSIS — B00.9 HSV-1 (HERPES SIMPLEX VIRUS 1) INFECTION: ICD-10-CM

## 2024-10-30 DIAGNOSIS — F32.A ANXIETY AND DEPRESSION: ICD-10-CM

## 2024-10-30 PROCEDURE — 82465 ASSAY BLD/SERUM CHOLESTEROL: CPT | Mod: ORL | Performed by: NURSE PRACTITIONER

## 2024-10-30 PROCEDURE — 82306 VITAMIN D 25 HYDROXY: CPT | Mod: ORL | Performed by: NURSE PRACTITIONER

## 2024-10-30 PROCEDURE — 84155 ASSAY OF PROTEIN SERUM: CPT | Mod: ORL | Performed by: NURSE PRACTITIONER

## 2024-10-30 PROCEDURE — 83721 ASSAY OF BLOOD LIPOPROTEIN: CPT | Mod: ORL | Performed by: NURSE PRACTITIONER

## 2024-10-30 RX ORDER — ESCITALOPRAM OXALATE 20 MG/1
20 TABLET ORAL DAILY
Qty: 90 TABLET | Refills: 3 | Status: SHIPPED | OUTPATIENT
Start: 2024-10-30

## 2024-10-30 RX ORDER — VALACYCLOVIR HYDROCHLORIDE 1 G/1
1000 TABLET, FILM COATED ORAL 2 TIMES DAILY
Qty: 10 TABLET | Refills: 2 | Status: SHIPPED | OUTPATIENT
Start: 2024-10-30

## 2024-10-30 RX ORDER — FINASTERIDE 1 MG/1
1 TABLET, FILM COATED ORAL DAILY
Qty: 90 TABLET | Refills: 3 | Status: SHIPPED | OUTPATIENT
Start: 2024-10-30

## 2024-10-30 ASSESSMENT — PAIN SCALES - GENERAL: PAINLEVEL_OUTOF10: NO PAIN (0)

## 2024-10-30 NOTE — NURSING NOTE
"ROOM:1  ALEXEY PADILLA    Preferred Name: Ralph     Social Determinants of Health   SDoH screening reviewed today: Yes     Services Provided? No    39 year old  Chief Complaint   Patient presents with    Follow Up       Blood pressure (!) 142/89, pulse 92, temperature 98.4  F (36.9  C), temperature source Oral, resp. rate 18, height 1.778 m (5' 10\"), weight 101.4 kg (223 lb 9.6 oz), SpO2 96%. Body mass index is 32.08 kg/m .  BP completed using cuff size:        Patient Active Problem List   Diagnosis    Right elbow tendonitis       Wt Readings from Last 2 Encounters:   10/30/24 101.4 kg (223 lb 9.6 oz)   11/08/23 102.3 kg (225 lb 9.6 oz)     BP Readings from Last 3 Encounters:   10/30/24 (!) 142/89   11/08/23 127/81   10/05/23 129/86       No Known Allergies    Current Outpatient Medications   Medication Sig Dispense Refill    atorvastatin (LIPITOR) 20 MG tablet Take 1 tablet (20 mg) by mouth daily 90 tablet 1    benzonatate (TESSALON) 200 MG capsule Take 1 capsule (200 mg) by mouth 3 times daily as needed for cough 21 capsule 0    cephALEXin (KEFLEX) 500 MG capsule Take 1 capsule (500 mg) by mouth 3 times daily 30 capsule 4    cephALEXin (KEFLEX) 500 MG capsule Take 1 capsule (500 mg) by mouth 3 times daily 30 capsule 0    escitalopram (LEXAPRO) 20 MG tablet Take 1 tablet (20 mg) by mouth daily 90 tablet 0    finasteride (PROPECIA) 1 MG tablet Take 1 tablet (1 mg) by mouth daily 30 tablet 3    valACYclovir (VALTREX) 1000 mg tablet TAKE 1 TABLET BY MOUTH TWICE DAILY 10 tablet 2    zolpidem (AMBIEN) 5 MG tablet Take tablet by mouth 15 minutes prior to sleep, for Sleep Study 1 tablet 0     Current Facility-Administered Medications   Medication Dose Route Frequency Provider Last Rate Last Admin    lidocaine (PF) (XYLOCAINE) injection 10 mL  10 mL Other Once Pete Bustillo MD           Social History     Tobacco Use    Smoking status: Never    Smokeless tobacco: Never   Substance Use Topics    Alcohol " "use: Not Currently     Comment: none    Drug use: Never       Honoring Choices - Health Care Directive Guide offered to patient at time of visit.    Health Maintenance Due   Topic Date Due    ADVANCE CARE PLANNING  Never done    HIV SCREENING  Never done    HEPATITIS C SCREENING  Never done    HEPATITIS B IMMUNIZATION (1 of 3 - 19+ 3-dose series) Never done    DTAP/TDAP/TD IMMUNIZATION (1 - Tdap) Never done    YEARLY PREVENTIVE VISIT  07/31/2024    BMP  07/31/2024    INFLUENZA VACCINE (1) 09/01/2024    COVID-19 Vaccine (6 - 2024-25 season) 09/01/2024    LIPID  11/08/2024       Immunization History   Administered Date(s) Administered    COVID-19 12+ (MODERNA) 10/30/2023    COVID-19 Bivalent 12+ (Pfizer) 11/04/2022    COVID-19 MONOVALENT 12+ (Pfizer) 10/29/2021    COVID-19 Monovalent 12+ (Pfizer 2022) 07/30/2022    COVID-19 Vaccine (Sol) 04/11/2021    Influenza Vaccine >6 months,quad, PF 09/15/2018, 09/23/2020, 09/15/2021       No results found for: \"PAP\"    Recent Labs   Lab Test 11/08/23  1107 07/31/23  1011 09/23/20  1306   * 216* 129*   HDL 32* 34* 35*   TRIG 148 259* 292*   ALT  --  26 24   CR  --  0.88 0.79   GFRESTIMATED  --  >90 >90   GFRESTBLACK  --   --  >90   ALBUMIN  --  5.1 4.2   POTASSIUM  --  4.4 4.3   TSH  --   --  1.23           10/30/2024     4:08 PM 1/23/2024     5:19 PM   PHQ-2 ( 1999 Pfizer)   Q1: Little interest or pleasure in doing things 0 0    Q2: Feeling down, depressed or hopeless 0 0    PHQ-2 Score 0 0   Q1: Little interest or pleasure in doing things  Not at all   Q2: Feeling down, depressed or hopeless  Not at all   PHQ-2 Score  0       Patient-reported           9/23/2020    12:47 PM 10/11/2021     7:46 AM 11/2/2021    10:17 AM 7/31/2023     8:22 AM   PHQ-9 SCORE   PHQ-9 Total Score MyChart   8 (Mild depression) 5 (Mild depression)   PHQ-9 Total Score 6 18 8 5           10/11/2021     7:46 AM 11/2/2021    10:17 AM 7/31/2023     8:23 AM   CARROL-7 SCORE   Total Score  7 (mild " anxiety) 5 (mild anxiety)   Total Score 19 7 5            No data to display                Amanda Bourgeois, EMT    October 30, 2024 4:13 PM

## 2024-10-30 NOTE — PROGRESS NOTES
Ralph Nieves is a 39 year old male who presents today to follow up on hyperlipidemia.  MARE does not feel that he has changed his lifestyle much, he is interested but has not made changes yet.  He does have access to opportunity, he will continue to find ways to engage with lifestyle measures.  Overall he feels well.    Review Of Systems   ROS: 10 point ROS neg other than the symptoms noted above in the HPI.      History reviewed. No pertinent past medical history.  Past Surgical History:   Procedure Laterality Date    APPENDECTOMY  2015    Ruptured in [place]    XR KNEE SURGERY HENRIQUE LEFT       Social History     Socioeconomic History    Marital status: Single     Spouse name: Not on file    Number of children: Not on file    Years of education: Not on file    Highest education level: Not on file   Occupational History    Not on file   Tobacco Use    Smoking status: Never    Smokeless tobacco: Never   Substance and Sexual Activity    Alcohol use: Not Currently     Comment: none    Drug use: Never    Sexual activity: Yes     Partners: Female     Birth control/protection: I.U.D.   Other Topics Concern    Not on file   Social History Narrative    Not on file     Social Drivers of Health     Financial Resource Strain: Low Risk  (10/30/2024)    Financial Resource Strain     Within the past 12 months, have you or your family members you live with been unable to get utilities (heat, electricity) when it was really needed?: No   Food Insecurity: Low Risk  (10/30/2024)    Food Insecurity     Within the past 12 months, did you worry that your food would run out before you got money to buy more?: No     Within the past 12 months, did the food you bought just not last and you didn t have money to get more?: No   Transportation Needs: Low Risk  (10/30/2024)    Transportation Needs     Within the past 12 months, has lack of transportation kept you from medical appointments, getting your medicines, non-medical meetings or  "appointments, work, or from getting things that you need?: No   Physical Activity: Not on file   Stress: No Stress Concern Present (10/30/2024)    [employer] - Occupational Stress Questionnaire     Feeling of Stress : Only a little   Social Connections: Not on file   Interpersonal Safety: Low Risk  (10/30/2024)    Interpersonal Safety     Do you feel physically and emotionally safe where you currently live?: Yes     Within the past 12 months, have you been hit, slapped, kicked or otherwise physically hurt by someone?: No     Within the past 12 months, have you been humiliated or emotionally abused in other ways by your partner or ex-partner?: No   Housing Stability: Low Risk  (10/30/2024)    Housing Stability     Do you have housing? : Yes     Are you worried about losing your housing?: No     Family History   Problem Relation Age of Onset    No Known Problems Mother     No Known Problems Father     Diabetes Maternal Grandfather        BP (!) 142/89 (BP Location: Left arm, Patient Position: Sitting, Cuff Size: Adult Regular)   Pulse 92   Temp 98.4  F (36.9  C) (Oral)   Resp 18   Ht 1.778 m (5' 10\")   Wt 101.4 kg (223 lb 9.6 oz)   SpO2 96%   BMI 32.08 kg/m      Exam:  Constitutional: healthy, alert, and no distress  Cardiovascular: negative, PMI normal. No lifts, heaves, or thrills. RRR. No murmurs, clicks gallops or rub  Respiratory: negative, Percussion normal. Good diaphragmatic excursion. Lungs clear  Psychiatric: mentation appears normal and affect normal/bright    Assessment/Plan:  1. Hyperlipidemia LDL goal <100 (Primary)    - Lipid panel reflex to direct LDL Fasting; Future  - Comprehensive metabolic panel; Future    2. Medication management    - Lipid panel reflex to direct LDL Fasting; Future  - Comprehensive metabolic panel; Future    3. Low vitamin D level    - Vitamin D Level; Future    Follow up 6 months and prn        Options for treatment and follow-up care were " reviewed with the patient. Patient engaged in the decision making process and verbalized understanding of the options discussed and agreed with the final plan.

## 2024-10-31 LAB
ALBUMIN SERPL BCG-MCNC: 4.6 G/DL (ref 3.5–5.2)
ALP SERPL-CCNC: 70 U/L (ref 40–150)
ALT SERPL W P-5'-P-CCNC: ABNORMAL U/L
ANION GAP SERPL CALCULATED.3IONS-SCNC: 11 MMOL/L (ref 7–15)
AST SERPL W P-5'-P-CCNC: ABNORMAL U/L
BILIRUB SERPL-MCNC: 0.2 MG/DL
BUN SERPL-MCNC: 14.9 MG/DL (ref 6–20)
CALCIUM SERPL-MCNC: 9.4 MG/DL (ref 8.8–10.4)
CHLORIDE SERPL-SCNC: 103 MMOL/L (ref 98–107)
CHOLEST SERPL-MCNC: 210 MG/DL
CREAT SERPL-MCNC: 1.01 MG/DL (ref 0.67–1.17)
EGFRCR SERPLBLD CKD-EPI 2021: >90 ML/MIN/1.73M2
FASTING STATUS PATIENT QL REPORTED: ABNORMAL
FASTING STATUS PATIENT QL REPORTED: ABNORMAL
GLUCOSE SERPL-MCNC: 106 MG/DL (ref 70–99)
HCO3 SERPL-SCNC: 23 MMOL/L (ref 22–29)
HDLC SERPL-MCNC: 28 MG/DL
LDLC SERPL CALC-MCNC: ABNORMAL MG/DL
LDLC SERPL DIRECT ASSAY-MCNC: 114 MG/DL
NONHDLC SERPL-MCNC: 182 MG/DL
POTASSIUM SERPL-SCNC: 3.9 MMOL/L (ref 3.4–5.3)
PROT SERPL-MCNC: 7 G/DL (ref 6.4–8.3)
SODIUM SERPL-SCNC: 137 MMOL/L (ref 135–145)
TRIGL SERPL-MCNC: 677 MG/DL
VIT D+METAB SERPL-MCNC: 19 NG/ML (ref 20–50)

## 2024-11-05 DIAGNOSIS — E88.810 METABOLIC SYNDROME X: Primary | ICD-10-CM

## 2025-01-08 DIAGNOSIS — E78.1 HYPERTRIGLYCERIDEMIA: Primary | ICD-10-CM

## 2025-01-08 DIAGNOSIS — E78.5 HYPERLIPIDEMIA LDL GOAL <100: ICD-10-CM

## 2025-01-09 ENCOUNTER — OFFICE VISIT (OUTPATIENT)
Dept: CARDIOLOGY | Facility: CLINIC | Age: 40
End: 2025-01-09
Payer: COMMERCIAL

## 2025-01-09 ENCOUNTER — LAB (OUTPATIENT)
Dept: LAB | Facility: CLINIC | Age: 40
End: 2025-01-09
Payer: COMMERCIAL

## 2025-01-09 VITALS
DIASTOLIC BLOOD PRESSURE: 82 MMHG | HEART RATE: 78 BPM | HEIGHT: 70 IN | BODY MASS INDEX: 31.21 KG/M2 | WEIGHT: 218 LBS | OXYGEN SATURATION: 94 % | SYSTOLIC BLOOD PRESSURE: 120 MMHG

## 2025-01-09 DIAGNOSIS — E78.5 HYPERLIPIDEMIA LDL GOAL <100: ICD-10-CM

## 2025-01-09 DIAGNOSIS — E78.5 HYPERLIPIDEMIA LDL GOAL <100: Primary | ICD-10-CM

## 2025-01-09 DIAGNOSIS — F32.A DEPRESSION, UNSPECIFIED DEPRESSION TYPE: ICD-10-CM

## 2025-01-09 DIAGNOSIS — E78.1 HYPERTRIGLYCERIDEMIA: ICD-10-CM

## 2025-01-09 LAB
APO A-I SERPL-MCNC: <6 MG/DL
ATRIAL RATE - MUSE: 75 BPM
CREAT UR-MCNC: 393 MG/DL
CRP SERPL HS-MCNC: 1.57 MG/L
DIASTOLIC BLOOD PRESSURE - MUSE: NORMAL MMHG
FASTING STATUS PATIENT QL REPORTED: YES
GLUCOSE SERPL-MCNC: 111 MG/DL (ref 70–99)
INTERPRETATION ECG - MUSE: NORMAL
MICROALBUMIN UR-MCNC: <12 MG/L
MICROALBUMIN/CREAT UR: NORMAL MG/G{CREAT}
P AXIS - MUSE: 33 DEGREES
PR INTERVAL - MUSE: 224 MS
QRS DURATION - MUSE: 92 MS
QT - MUSE: 380 MS
QTC - MUSE: 424 MS
R AXIS - MUSE: 18 DEGREES
SYSTOLIC BLOOD PRESSURE - MUSE: NORMAL MMHG
T AXIS - MUSE: 34 DEGREES
TRIGL SERPL-MCNC: 221 MG/DL
VENTRICULAR RATE- MUSE: 75 BPM

## 2025-01-09 PROCEDURE — 3074F SYST BP LT 130 MM HG: CPT | Performed by: NURSE PRACTITIONER

## 2025-01-09 PROCEDURE — 93000 ELECTROCARDIOGRAM COMPLETE: CPT | Performed by: INTERNAL MEDICINE

## 2025-01-09 PROCEDURE — 82043 UR ALBUMIN QUANTITATIVE: CPT | Performed by: NURSE PRACTITIONER

## 2025-01-09 PROCEDURE — 99205 OFFICE O/P NEW HI 60 MIN: CPT | Mod: 25 | Performed by: NURSE PRACTITIONER

## 2025-01-09 PROCEDURE — 86141 C-REACTIVE PROTEIN HS: CPT | Performed by: NURSE PRACTITIONER

## 2025-01-09 PROCEDURE — 83695 ASSAY OF LIPOPROTEIN(A): CPT | Performed by: NURSE PRACTITIONER

## 2025-01-09 PROCEDURE — 82570 ASSAY OF URINE CREATININE: CPT | Performed by: NURSE PRACTITIONER

## 2025-01-09 PROCEDURE — 99000 SPECIMEN HANDLING OFFICE-LAB: CPT | Performed by: PATHOLOGY

## 2025-01-09 PROCEDURE — 36415 COLL VENOUS BLD VENIPUNCTURE: CPT | Performed by: PATHOLOGY

## 2025-01-09 PROCEDURE — 84478 ASSAY OF TRIGLYCERIDES: CPT | Performed by: NURSE PRACTITIONER

## 2025-01-09 PROCEDURE — 93922 UPR/L XTREMITY ART 2 LEVELS: CPT | Performed by: NURSE PRACTITIONER

## 2025-01-09 PROCEDURE — 3079F DIAST BP 80-89 MM HG: CPT | Performed by: NURSE PRACTITIONER

## 2025-01-09 PROCEDURE — 82947 ASSAY GLUCOSE BLOOD QUANT: CPT | Performed by: PATHOLOGY

## 2025-01-09 NOTE — LETTER
1/9/2025      RE: Ralph Nieves  [address]  [facility] [address]       Dear Colleague,    Thank you for the opportunity to participate in the care of your patient, Ralph Nieves, at the [facility] CARDIOVASC[facility] DISE[facility] PREVENTION [facility]. Please see a copy of my visit note below.      [facility] Cardiovascular Disease Prevention - Exam Note    Active Problems   Patient Active Problem List    Diagnosis Date Noted     Right elbow tendonitis 02/17/2023     Priority: Medium       Reason For Visit   Patient here for [facility] early detection of atherosclerosis and CVD exam.    Pain Evaluation  Current history of pain associated with this visit is: denied    Cardiac risk factors:    - age      - smoking      + elevated BMI        + Family history CVD         + Diet           - Hypertension    HPI   Ralph Nieves is a 39 year old year old male with a history of hyperlipidemia and depression. He has been taking 20 mg of atorvastatin for the past 2 years. His family history of CV disease included both parents with hypertension and hyperlipidemia as well as maternal grandparents and maternal grandmother. His primary care provider is Celeste Roy CNP at the NP clinic in [facility]. He works for as [employer]. He states that his job can be stressful.   He has noticed that he gets more SOB getting up and down from his chair. Today in clinic he denies chest pain at rest, with activity, while sleeping, SOB with activity or while sleeping, palpitations, lightheadedness, lower leg edema, calf cramps, indigestion, headaches.  He states that he does have issues with his memory but nothing that he is concerned about.     Nutrition assessment per patient report:   Foods with fat/cholesterol (fried foods, fatty meats, junk food):   trying to cut down recently    Fruits and vegetables (  cup cooked, 1 cup  raw):   2-4 servings of fruit/day. 1-3 servings of fruit/day  Caffeine (1 cup coffee, soda, etc):   4 cups of coffee/day  Alcohol servings (12 oz. beer, 4 oz. wine, 1  oz. in mixed drink):  0 servings  Special dietary habits:   increase vegetable intake  Typical breakfast:  skip                 Lunch: skips                 Dinner: mid-day tuna poke, salad, rice, take-out taco bell                 Snacks: chips                 Drinks:  water  Activity  Patient is is not active, used to bike     Sleep pattern: good    Laboratory Results Review  We discussed laboratory results today including lipids targets and how foods influence cholesterol.    Weight  His perceived healthy weight is 200 pounds.  A normal BMI of 25 is equal to *** pounds.  The current BMI of *** is {normal, overweight, high-risk:311205}.  A weight reduction speed of {WEIGHT REDUCTION SPEED:580693} is recommended.    PMH   No past medical history on file.    PSH  Past Surgical History:   Procedure Laterality Date     APPENDECTOMY  2015    Ruptured in [place]     XR KNEE SURGERY HENRIQUE LEFT         Current Meds   Current Outpatient Medications   Medication Sig Dispense Refill     atorvastatin (LIPITOR) 20 MG tablet Take 1 tablet (20 mg) by mouth daily 90 tablet 1     escitalopram (LEXAPRO) 20 MG tablet Take 1 tablet (20 mg) by mouth daily. 90 tablet 3     finasteride (PROPECIA) 1 MG tablet Take 1 tablet (1 mg) by mouth daily. 90 tablet 3     valACYclovir (VALTREX) 1000 mg tablet Take 1 tablet (1,000 mg) by mouth 2 times daily. 10 tablet 2       Allergies    No Known Allergies    Family Hx   Family History   Problem Relation Age of Onset     No Known Problems Mother      No Known Problems Father      Diabetes Maternal Grandfather        Social History    He is {MARITAL STATUS:62}     Tobacco History  History   Smoking Status     Never   Smokeless Tobacco     Never       ROS  General:  WDWN in NAD  EENT:  Denies visual disturbances, epistaxis, sore  "throat  Respiratory:  Denies SOB, cough, sputum production  Cardiovascular:  see HPI  GI:  Denies nausea, vomiting, hematemesis, melena  :  denies hematuria, dysuria  Skin:  Denies rashes, lesions or open wounds  Psych:  Pleasant affect    Vital Signs   There were no vitals taken for this visit.      Waist: *** inches  Hip: *** inches    Physical Exam   In general, the patient is a pleasant male in no apparent distress   HEENT: NC/AT, PERRLA, EOMI, sclerae white, not injected. Nares clear, pharynx without erythema or exudate, dentition intact    Neck: No adenopathy, no thyromegaly, carotids +4/4 bilaterally without bruits,  no jugular venous distension   Lungs: Breath sounds clear bilaterally, without crackles, ronchi, or wheezes  Cor: RRR, S1S2 without murmur, rub, click, or gallop, the PMI is in the 5th ICS in the midclavicular line  Abdomen: Soft, nontender, nondistended, BS+ in all 4 quadrants, without hepatomegaly, no aorta or renal artery bruits  Extremities: No clubbing, cyanosis, or edema. DP and PT pulses +2/4 bilaterally    The 10-year ASCVD risk score (Golf BHUPINDER Jr., et al., 2013) is: ***%  Values used to calculate the score:   Age: 39 year old   Sex: male   Is Non- : {Yes and No:634936}   Diabetic: {Yes and No:821907}   Tobacco smoker: {Yes and No:982480}   Systolic Blood Press: *** mmHg   Is BP treated: {Yes and No:599524}   HDL Cholesterol: *** mg/dL   Total Cholesterol: *** mg/dL    Recent Labs  Lab Results   Component Value Date     (H) 01/09/2025    GLC 87 09/23/2020      No results found for: \"NTBNP\"  No results found for: \"NTBNPI\"   No results found for: \"UCRR\"   No results found for: \"MICROL\"   No results found for: \"MICROALBUMIN\"   No results found for: \"CRP\"   Lab Results   Component Value Date    CHOL 210 (H) 10/30/2024    CHOL 223 (H) 09/23/2020      Lab Results   Component Value Date    TRIG 677 (H) 10/30/2024    TRIG 292 (H) 09/23/2020      Lab Results " "  Component Value Date    HDL 28 (L) 10/30/2024    HDL 35 (L) 09/23/2020      Lab Results   Component Value Date    LDL  10/30/2024      Comment:      Cannot estimate LDL when triglyceride exceeds 400 mg/dL     (H) 10/30/2024     (H) 09/23/2020      No results found for: \"VLDL\"   No results found for: \"CHOLHDLRATIO\"  Lab Results   Component Value Date    NHDL 182 (H) 10/30/2024    NHDL 188 (H) 09/23/2020        Assessment:    Cardiovascular:  Asymptomatic, *** is not complaining of chest pain, EKG revealed ***, no plaque detected in carotid arteries    Blood Pressure:  *** does not take BP medication    Lipids:  He takes 20 mg of atorvastatin       Latest Ref Rng 7/31/2023  10:11 AM 10/5/2023  2:01 PM 11/8/2023  10:44 AM 11/8/2023  11:07 AM 10/30/2024  4:09 PM   BP WT  CHOL         Cholesterol <200 mg/dL 302 (H)    170     HDL Cholesterol >=40 mg/dL 34 (L)    32 (L)     LDL Cholesterol Calculated <100 mg/dL 216 (H)    108 (H)     Triglycerides <150 mg/dL 259 (H)    148        Latest Ref Rng 10/30/2024  4:35 PM 1/9/2025  9:58 AM   BP WT  CHOL      Cholesterol <200 mg/dL 210 (H)     HDL Cholesterol >=40 mg/dL 28 (L)     LDL Cholesterol Calculated <100 mg/dL --     LDL Cholesterol Calculated  114 (H)     Triglycerides <150 mg/dL 677 (H)        Glucose: 111, continue to monitor with PCP, check A1C    Sleep pattern:  Sleep hygiene reviewed during clinic visit, handout given to patient    Weight Management: BMI 31.28, recommend considering consultation with comprehensive weight management     Return to Clinic: 5 years    Health Habit Summary:  Nutrition: Heart Healthy Eating:  most of the time   Exercise:  not regularly active  Weight:  high-risk range  Tobacco Use:  never used    This case was presented to Dr. Price and Dr. Usha Collado during our weekly conference.     60 minutes spent on the date of the encounter doing (chart review/review of outside records/review of test results/interpretation of " tests/patient visit/documentation/discussion with other provider(s)   CURT Arthur CNP       CC  Patient Care Team:  Marlyn Haji MD as PCP - General (Family Medicine)  Celeste Roy NP as Assigned PCP  Pete Bustillo MD as MD (Urology)  Pete Bustillo MD as Assigned Surgical Provider  Angelo Valentino PA-C as Assigned Sleep Provider  SELF, REFERRED      Please do not hesitate to contact me if you have any questions/concerns.     Sincerely,     CURT Arthur CNP   for BSA)  LVIDD 45.4 mm   Septa 9.5 mm   Posterior 9.8 mm     Left Ventricular US Assessment:  normal    Carotid Artery IMT measurements report and plaques in the small area examined:   Left IMT 0.578 mm  Plaques none    Right IMT 0.477 mm  Plaques none     Test results: Carotid arteries wall thickening is in normal range with no plaque formations present.     ECG (see tracing):  normal sinus rhythm;  rate: 75 bpm    Arterial Elasticity per age and gender (see printout):   C1 15.5 mL/mmHg x 10  normal   C2 5.1 mL/mmHg x 100 normal   Supine blood pressure: 127/82 mmHg     Test results: Arterial elasticity of the large size arteries is in normal range after adjusting for age and gender. Arterial elasticity of the small size arteries is in abnormal range after adjusting for age and gender.     Lazar disease score: 2    Nakia Quiñones      Please do not hesitate to contact me if you have any questions/concerns.     Sincerely,     CURT Arthur CNP

## 2025-01-09 NOTE — PROGRESS NOTES
Lazar Test Results    WALKING BLOOD PRESSURE RESPONSE (3 minute, 5 MET level walk)   Pre BP: 120/82 mmHg  3 min BP: 144/60 mmHg  1 min post BP: 130/80 mmHg    Pre HR: 72 bpm  3 min HR: 132 bpm  1 min post HR: 80 bpm     Test results: Walking blood pressure response to 3 minutes activity is in normal range.     RETINAL VASCULAR ASSESSMENT   Left Eye Abnormality:  none  AV Ratio: 0.8    Right Eye Abnormality:  none  AV Ratio: 0.8     Retinal Assessment:  normal    ABDOMINAL AORTA ULTRASOUND (< 2.5 normal, borderline 2.5-2.9, abnormal > 3)   SupraIliac 1.87 cm    SupraRenal 1.84 cm    InfraRenal Proximal 1.68 cm    InfraRenal Distal 1.74 cm      Abdominal Aorta Assessment:  normal    LEFT VENTRICULAR ULTRASOUND MEASUREMENTS (adjusted for BSA)  LVIDD 45.4 mm   Septa 9.5 mm   Posterior 9.8 mm     Left Ventricular US Assessment:  normal    Carotid Artery IMT measurements report and plaques in the small area examined:   Left IMT 0.578 mm  Plaques none    Right IMT 0.477 mm  Plaques none     Test results: Carotid arteries wall thickening is in normal range with no plaque formations present.     ECG (see tracing):  normal sinus rhythm;  rate: 75 bpm    Arterial Elasticity per age and gender (see printout):   C1 15.5 mL/mmHg x 10  normal   C2 5.1 mL/mmHg x 100 normal   Supine blood pressure: 127/82 mmHg     Test results: Arterial elasticity of the large size arteries is in normal range after adjusting for age and gender. Arterial elasticity of the small size arteries is in abnormal range after adjusting for age and gender.     Lazar disease score: 2    Nakia Quiñones

## 2025-01-09 NOTE — PROGRESS NOTES
[facility] Cardi[facility] Disease Prevention - Exam Note    Active Problems   Patient Active Problem List    Diagnosis Date Noted    Hyperlipidemia LDL goal <100      Priority: Medium    Depression      Priority: Medium    Right elbow tendonitis 02/17/2023     Priority: Medium       Reason For Visit   Patient here for [facility] early detection of atherosclerosis and CVD exam.    Pain Evaluation  Current history of pain associated with this visit is: denied    Cardiac risk factors:    - age      - smoking      + elevated BMI        + Family history CVD         + Diet           - Hypertension    HPI   Ralph Nieves is a 39 year old year old male with a history of hyperlipidemia and depression. He has been taking 20 mg of atorvastatin for the past 2 years. His family history of CV disease included both parents with hypertension and hyperlipidemia as well as maternal grandparents and maternal grandmother. His primary care provider is Celeste Roy CNP at the NP clinic in [facility]. He works for as [employer]. He states that his job can be stressful.   He has noticed that he gets more SOB getting up and down from his chair. Today in clinic he denies chest pain at rest, with activity, while sleeping, SOB with activity or while sleeping, palpitations, lightheadedness, lower leg edema, calf cramps, indigestion, headaches.  He states that he does have issues with his memory but nothing that he is concerned about.     Nutrition assessment per patient report:   Foods with fat/cholesterol (fried foods, fatty meats, junk food):   trying to cut down recently    Fruits and vegetables (  cup cooked, 1 cup raw):   2-4 servings of fruit/day. 1-3 servings of fruit/day  Caffeine (1 cup coffee, soda, etc):   4 cups of coffee/day  Alcohol servings (12 oz. beer, 4 oz. wine, 1  oz. in mixed drink):  0 servings  Special dietary habits:   increase vegetable intake  Typical breakfast:  skip                  Lunch: skips                 Dinner: mid-day tuna poke, salad, rice, take-out taco bell                 Snacks: chips                 Drinks:  water  Activity  Patient is is not active, used to bike     Sleep pattern: good    Laboratory Results Review  We discussed laboratory results today including lipids targets and how foods influence cholesterol.    Weight  His perceived healthy weight is 200 pounds.  A normal BMI of 25 is equal to 174 pounds.  The current BMI of 31.28 is high-risk range.  A weight reduction speed of 2-3 lbs per month for men is recommended.    PMH   Past Medical History:   Diagnosis Date    Depression     Hyperlipidemia LDL goal <100        PSH  Past Surgical History:   Procedure Laterality Date    APPENDECTOMY  2015    Ruptured in [place]    XR KNEE SURGERY HENRIQUE LEFT         Current Meds   Current Outpatient Medications   Medication Sig Dispense Refill    atorvastatin (LIPITOR) 20 MG tablet Take 1 tablet (20 mg) by mouth daily 90 tablet 1    escitalopram (LEXAPRO) 20 MG tablet Take 1 tablet (20 mg) by mouth daily. 90 tablet 3    finasteride (PROPECIA) 1 MG tablet Take 1 tablet (1 mg) by mouth daily. 90 tablet 3    valACYclovir (VALTREX) 1000 mg tablet Take 1 tablet (1,000 mg) by mouth 2 times daily. 10 tablet 2       Allergies    No Known Allergies    Family Hx   Family History   Problem Relation Age of Onset    Hypertension Mother     Hyperlipidemia Mother     Hypertension Father     Hyperlipidemia Father     Hyperlipidemia Maternal Grandmother     Hypertension Maternal Grandmother     Diabetes Maternal Grandfather     Prostate Cancer Maternal Grandfather     Hypertension Maternal Grandfather     Hyperlipidemia Maternal Grandfather     Hypertension Paternal Grandmother     Hyperlipidemia Paternal Grandmother     Suicide Paternal Grandfather        Social History    He is  without  children    Tobacco History  History   Smoking Status    Never   Smokeless Tobacco    Never  "      ROS  General:  WDWN in NAD  EENT:  Denies visual disturbances, epistaxis, sore throat  Respiratory:  Denies SOB, cough, sputum production  Cardiovascular:  see HPI  GI:  Denies nausea, vomiting, hematemesis, melena  :  denies hematuria, dysuria  Skin:  Denies rashes, lesions or open wounds  Psych:  Pleasant affect    Vital Signs   /82 (Cuff Size: Adult Regular)   Pulse 78   Ht 1.778 m (5' 10\")   Wt 98.9 kg (218 lb)   SpO2 94%   BMI 31.28 kg/m        Waist: 41 inches  Hip: 44 inches    Physical Exam   In general, the patient is a pleasant male in no apparent distress   HEENT: NC/AT, PERRLA, EOMI, sclerae white, not injected. Nares clear, pharynx without erythema or exudate, dentition intact    Neck: No adenopathy, no thyromegaly, carotids +4/4 bilaterally without bruits,  no jugular venous distension   Lungs: Breath sounds clear bilaterally, without crackles, ronchi, or wheezes  Cor: RRR, S1S2 without murmur, rub, click, or gallop, the PMI is in the 5th ICS in the midclavicular line  Abdomen: Soft, nontender, nondistended, BS+ in all 4 quadrants, without hepatomegaly, no aorta or renal artery bruits  Extremities: No clubbing, cyanosis, or edema. DP and PT pulses +2/4 bilaterally    The 10-year ASCVD risk score ([place] ROE Jr., et al., 2013) is: Too young to calculate%  Values used to calculate the score:   Age: 39 year old   Sex: male   Is Non- : No   Diabetic: No   Tobacco smoker: No   Systolic Blood Press: 124 mmHg   Is BP treated: No   HDL Cholesterol: 28 mg/dL   Total Cholesterol: 210 mg/dL    Recent Labs  Lab Results   Component Value Date     (H) 01/09/2025    GLC 87 09/23/2020      No results found for: \"NTBNP\"  No results found for: \"NTBNPI\"   No results found for: \"UCRR\"   No results found for: \"MICROL\"   No results found for: \"MICROALBUMIN\"   No results found for: \"CRP\"   Lab Results   Component Value Date    CHOL 210 (H) 10/30/2024    CHOL 223 (H) 09/23/2020 " "     Lab Results   Component Value Date    TRIG 677 (H) 10/30/2024    TRIG 292 (H) 09/23/2020      Lab Results   Component Value Date    HDL 28 (L) 10/30/2024    HDL 35 (L) 09/23/2020      Lab Results   Component Value Date    LDL  10/30/2024      Comment:      Cannot estimate LDL when triglyceride exceeds 400 mg/dL     (H) 10/30/2024     (H) 09/23/2020      No results found for: \"VLDL\"   No results found for: \"CHOLHDLRATIO\"  Lab Results   Component Value Date    NHDL 182 (H) 10/30/2024    NHDL 188 (H) 09/23/2020        Assessment:    Cardiovascular:  Asymptomatic, he is not complaining of chest pain, EKG revealed 1st degree heart block, no plaque detected in carotid arteries    Blood Pressure:  He does not take BP medication, -124/82 mmHg    Lipids:  He takes 20 mg of atorvastatin, check Lp(a), recheck trig level today      Latest Ref Rng 7/31/2023  10:11 AM 10/5/2023  2:01 PM 11/8/2023  10:44 AM 11/8/2023  11:07 AM 10/30/2024  4:09 PM   BP WT  CHOL         Cholesterol <200 mg/dL 302 (H)    170     HDL Cholesterol >=40 mg/dL 34 (L)    32 (L)     LDL Cholesterol Calculated <100 mg/dL 216 (H)    108 (H)     Triglycerides <150 mg/dL 259 (H)    148        Latest Ref Rng 10/30/2024  4:35 PM 1/9/2025  9:58 AM   BP WT  CHOL      Cholesterol <200 mg/dL 210 (H)     HDL Cholesterol >=40 mg/dL 28 (L)     LDL Cholesterol Calculated <100 mg/dL --     LDL Cholesterol Calculated  114 (H)     Triglycerides <150 mg/dL 677 (H)        Glucose: 111, continue to monitor with PCP, check A1C    Sleep pattern:  Sleep hygiene reviewed during clinic visit, handout given to patient    Weight Management: BMI 31.28, recommend considering consultation with comprehensive weight management     Return to Clinic: 5 years    Health Habit Summary:  Nutrition: Heart Healthy Eating:  most of the time   Exercise:  not regularly active  Weight:  high-risk range  Tobacco Use:  never used    This case was presented to Dr. Price and Dr." Usha Collado during our weekly conference.     60 minutes spent on the date of the encounter doing (chart review/review of outside records/review of test results/interpretation of tests/patient visit/documentation/discussion with other provider(s)   CURT Arthur CNP       CC  Patient Care Team:  Marlyn Haji MD as PCP - General (Family Medicine)  Celeste Roy NP as Assigned PCP  Pete Bustillo MD as MD (Urology)  Pete Bustillo MD as Assigned Surgical Provider  Angelo Valentino PA-C as Assigned Sleep Provider  SELF, REFERRED

## 2025-01-10 LAB
ATRIAL RATE - MUSE: 75 BPM
DIASTOLIC BLOOD PRESSURE - MUSE: NORMAL MMHG
INTERPRETATION ECG - MUSE: NORMAL
P AXIS - MUSE: 33 DEGREES
PR INTERVAL - MUSE: 224 MS
QRS DURATION - MUSE: 92 MS
QT - MUSE: 380 MS
QTC - MUSE: 424 MS
R AXIS - MUSE: 18 DEGREES
SYSTOLIC BLOOD PRESSURE - MUSE: NORMAL MMHG
T AXIS - MUSE: 34 DEGREES
VENTRICULAR RATE- MUSE: 75 BPM

## 2025-01-20 NOTE — PATIENT INSTRUCTIONS
Screening Results Summary Report     [facility] Cardiovascular Disease Prevention    Thank you for choosing to participate in the prevention screening offered at the [facility]. Prevention screening is important part of health care.  Atherosclerosis may result in heart attacks, strokes, heart failure, peripheral artery disease and shortened life expectancy. The risk for premature development of this disease is both genetic (family history) and environmental (diet, exercise, lifestyle, etc.).  Goals of your cardiovascular prevention screening include detecting the earliest signs of blood vessel or heart abnormalities, and identifying markers for risk that can be treated if identified early. Recommendations are included to improve health habits. In some cases medication may be recommended to help slow progression of disease. Our goal is to assist you in prevention of a heart attack, stroke and other cardiovascular diseases that are the major cause of illness and mortality in our society.    Your total cholesterol and LDL (bad cholesterol) results are slightly above the  optimal  range. Your HDL is low (ideally above 50) and your repeat triglyceride level was 221.   We recommend continuation of ongoing health habit modification (heart healthy nutrition, maintaining your weight within a normal weight range and an exercise routine) to maintain these levels.  An ideal weight range is a body mass index of 25 or less. Your lipoprotein (a) level is < 6 (normal range <29).  Continue to take 20 mg of atorvastatin.     2.  A BMI (body mass index) of over 30 increases the risk for heart attack, stroke, and diabetes.  Your BMI is 31.28 and adds to your risk.  A BMI of less than 30 is recommended.  The closer you can get to a BMI of 25 and still feel healthy for your musculature and frame size the better.  Weight reduction, even small amounts (5-10 pounds) can help reduce cholesterol levels.  For men, 2-3  pounds/month and for women, 1-2 pounds/month of weight loss is recommended with an initial goal of approximately 10% reduction of your body weight per year.  Slow weight loss will increase the chances that you will maintain your weight over time.  A dramatic fluctuation in weight (up and down weight) increases your cardiovascular risk and is not recommended.  Overall, a healthy stable weight is preferred.  We recommend a consultation with the [facility] Comprehensive Weight Management program.  An order has been placed in your electronic medical chart for this consultation. You can schedule the consultation by calling [phone].    3.  Your arterial elasticity (artery stiffness) is low and is consistent with abnormalities associated with the development of vascular (blood vessel) disease and high blood pressure. Statin cholesterol medications, some blood pressure medications and healthy living habits particularly exercise are helpful to preserve artery elasticity. Monitor your blood pressure once/week, record the results and bring those results with you to your next primary care clinic visit.  Notify your primary care provider if you notice that your blood pressure readings are increasing. Omron is good home blood pressure monitor brand to purchase.  This blood pressure cuff machine can be purchased at [employer] or [employer].     4.  Your diet is fairly heart healthy and well balanced. We recommend increasing your intake of vegetables to 4-5 servings/day and increasing your fruit intake to 3-4 sevings/day. Incorporating healthy fats of the Mediterranean diet into your diet is also a healthy idea. Eating salmon and extra virgin olive oit are good examples of healthy fats. Nutrients found in fruit, vegetables and whole grains have been shown to be beneficial for the long-term health of your heart and blood vessels. Monitoring your portion sizes is also a good idea.    5.  The American Heart Association  recommends 150 minutes of exercise per week, including strength (resistance) training.  Regular exercise can help maintain or lower cholesterol, blood pressure, blood glucose and improve the health of your heart and blood vessels. We recommend increasing your exercise routine to 5 days per week and adding a cardiovascular component to your exercise routine.  Always exercise within your comfort zone (no chest pain, able to talk comfortably). We spent quite a bit of time during your clinic visit discussing various ways to slowly increase your exercise routine.     6.  We suggest that you consider incorporating 4-7-8 relaxation breathing, mindfulness stress reduction, meditation, yoga,and/or aromatherapy into your healthy lifestyle routine. All of these integrative therapies have been shown to be useful in reducing stress and promoting health. The website for the [facility] at the [facility] is [email or web address]. Taking Charge of Your Health and Wellbeing is a wonderful assessment tool to learn more about your wellbeing.    7.  Your EKG revealed normal sinus rhythm with first degree heart block. No treatment is needed at this time.     8.  Return to clinic in 3-5 years.     Thank you for choosing to participate in the prevention screening at [facility] CV Prevention clinic.  Cardiovascular prevention screening is important. Atherosclerosis may result in heart attacks, strokes, heart failure, peripheral artery disease.    Faviola Landon, DNP, APRN, FNP-C

## 2025-03-07 PROBLEM — M77.8 RIGHT ELBOW TENDONITIS: Status: RESOLVED | Noted: 2023-02-17 | Resolved: 2025-03-07

## 2025-08-22 PROCEDURE — 82465 ASSAY BLD/SERUM CHOLESTEROL: CPT | Mod: ORL | Performed by: FAMILY MEDICINE

## (undated) RX ORDER — LIDOCAINE HYDROCHLORIDE 20 MG/ML
INJECTION, SOLUTION INFILTRATION; PERINEURAL
Status: DISPENSED
Start: 2023-10-05

## (undated) RX ORDER — LIDOCAINE HYDROCHLORIDE 10 MG/ML
INJECTION, SOLUTION EPIDURAL; INFILTRATION; INTRACAUDAL; PERINEURAL
Status: DISPENSED
Start: 2023-10-05